# Patient Record
Sex: MALE | Race: BLACK OR AFRICAN AMERICAN | NOT HISPANIC OR LATINO | Employment: OTHER | URBAN - METROPOLITAN AREA
[De-identification: names, ages, dates, MRNs, and addresses within clinical notes are randomized per-mention and may not be internally consistent; named-entity substitution may affect disease eponyms.]

---

## 2024-01-02 ENCOUNTER — OFFICE VISIT (OUTPATIENT)
Dept: ORTHOPEDIC SURGERY | Facility: CLINIC | Age: 59
End: 2024-01-02
Payer: COMMERCIAL

## 2024-01-02 VITALS — WEIGHT: 220 LBS | BODY MASS INDEX: 30.8 KG/M2 | HEIGHT: 71 IN

## 2024-01-02 DIAGNOSIS — M65.341 TRIGGER FINGER, RIGHT RING FINGER: ICD-10-CM

## 2024-01-02 DIAGNOSIS — M65.342 TRIGGER FINGER, LEFT RING FINGER: ICD-10-CM

## 2024-01-02 DIAGNOSIS — G56.03 CARPAL TUNNEL SYNDROME, BILATERAL: Primary | ICD-10-CM

## 2024-01-02 DIAGNOSIS — M65.332 TRIGGER FINGER, LEFT MIDDLE FINGER: ICD-10-CM

## 2024-01-02 PROCEDURE — 1036F TOBACCO NON-USER: CPT | Performed by: ORTHOPAEDIC SURGERY

## 2024-01-02 PROCEDURE — 99214 OFFICE O/P EST MOD 30 MIN: CPT | Performed by: ORTHOPAEDIC SURGERY

## 2024-01-02 PROCEDURE — 2500000004 HC RX 250 GENERAL PHARMACY W/ HCPCS (ALT 636 FOR OP/ED): Performed by: ORTHOPAEDIC SURGERY

## 2024-01-02 PROCEDURE — 20550 NJX 1 TENDON SHEATH/LIGAMENT: CPT | Performed by: ORTHOPAEDIC SURGERY

## 2024-01-02 PROCEDURE — 2500000005 HC RX 250 GENERAL PHARMACY W/O HCPCS: Performed by: ORTHOPAEDIC SURGERY

## 2024-01-02 PROCEDURE — 20526 THER INJECTION CARP TUNNEL: CPT | Performed by: ORTHOPAEDIC SURGERY

## 2024-01-02 RX ORDER — LIDOCAINE HYDROCHLORIDE 10 MG/ML
0.5 INJECTION INFILTRATION; PERINEURAL
Status: COMPLETED | OUTPATIENT
Start: 2024-01-02 | End: 2024-01-02

## 2024-01-02 RX ORDER — TRIAMCINOLONE ACETONIDE 40 MG/ML
20 INJECTION, SUSPENSION INTRA-ARTICULAR; INTRAMUSCULAR
Status: COMPLETED | OUTPATIENT
Start: 2024-01-02 | End: 2024-01-02

## 2024-01-02 RX ADMIN — LIDOCAINE HYDROCHLORIDE 0.5 ML: 10 INJECTION, SOLUTION INFILTRATION; PERINEURAL at 12:44

## 2024-01-02 RX ADMIN — LIDOCAINE HYDROCHLORIDE 0.5 ML: 10 INJECTION, SOLUTION INFILTRATION; PERINEURAL at 12:45

## 2024-01-02 RX ADMIN — TRIAMCINOLONE ACETONIDE 20 MG: 40 INJECTION, SUSPENSION INTRA-ARTICULAR; INTRAMUSCULAR at 12:44

## 2024-01-02 RX ADMIN — TRIAMCINOLONE ACETONIDE 20 MG: 40 INJECTION, SUSPENSION INTRA-ARTICULAR; INTRAMUSCULAR at 12:45

## 2024-01-02 ASSESSMENT — PAIN - FUNCTIONAL ASSESSMENT: PAIN_FUNCTIONAL_ASSESSMENT: 0-10

## 2024-01-02 ASSESSMENT — PAIN SCALES - GENERAL: PAINLEVEL_OUTOF10: 5 - MODERATE PAIN

## 2024-01-02 ASSESSMENT — PAIN DESCRIPTION - DESCRIPTORS: DESCRIPTORS: ACHING

## 2024-01-02 NOTE — PROGRESS NOTES
Subjective    Patient ID: Ha Hdz is a 58 y.o. male right hand dominant . His last visit was on 02/16/2023 and he had bilateral tunnel injections at that time.    Chief Complaint: Pain and Injections of the Left Hand and Pain and Injections of the Right Hand     Last Surgery: No surgery found  Last Surgery Date: No surgery found    He presents today for a recurrence of his Carpal Tunnel syndrome and Trigger fingers. He had bilateral carpal tunnel injections in 02/2023. His trigger injections were performed prior to that in another institution. Left hand is more significantly bothersome than the  right. Numbness and tingling are worse at night time. Catching and triggering of multiple fingers are worse in the morning.         Assessment:  Bilateral carpal Tunnel Syndrome  Multiple Trigger syndrome      Plan:  He has requested injections to help  him get through the winter month but ultimately wants to schedule surgery for the let hand likely in 07/2024. He had  bilateral tunnel injections today, and trigger injections.Left middle and ring fingers and right middle  fingers. He would call to schedule his left carpal tunnel release under the local anesthesia in the summer of 2024.        Objective   Right Hand Exam     Comments:  O/E: No finger atrophy, flexion but triggers with terminal flexion of the middle and ring fingers and right ring fingers. Positive dark imaging compression test bilaterally with diminished sensation in the nerve distribution.            I    Assessment/Plan   Encounter Diagnoses:  Bilateral carpal Tunnel Syndrome  Multiple Trigger syndrome      Plan:  He has requested injections to help him get through the winter month but ultimately wants to schedule surgery for the let hand likely in 07/2024. He had  bilateral tunnel injections today, and trigger injections.Left middle and ring fingers and right middle  fingers. He would call to schedule his left carpal tunnel release under  the local anesthesia in the summer of 2024.      Hand / UE Inj/Asp: bilateral carpal tunnel for carpal tunnel syndrome on 1/2/2024 12:44 PM  Indications: pain and therapeutic  Details: 25 G needle, volar approach  Medications (Right): 20 mg triamcinolone acetonide 40 mg/mL; 0.5 mL lidocaine 10 mg/mL (1 %)  Medications (Left): 20 mg triamcinolone acetonide 40 mg/mL; 0.5 mL lidocaine 10 mg/mL (1 %)  Outcome: tolerated well, no immediate complications  Procedure, treatment alternatives, risks and benefits explained, specific risks discussed. Consent was given by the patient. Immediately prior to procedure a time out was called to verify the correct patient, procedure, equipment, support staff and site/side marked as required. Patient was prepped and draped in the usual sterile fashion.       Hand / UE Inj/Asp: bilateral ring A1 for trigger finger on 1/2/2024 12:44 PM  Indications: tendon swelling and pain  Details: 25 G needle, volar approach  Medications (Right): 20 mg triamcinolone acetonide 40 mg/mL; 0.5 mL lidocaine 10 mg/mL (1 %)  Medications (Left): 20 mg triamcinolone acetonide 40 mg/mL; 0.5 mL lidocaine 10 mg/mL (1 %)  Outcome: tolerated well, no immediate complications  Procedure, treatment alternatives, risks and benefits explained, specific risks discussed. Consent was given by the patient. Immediately prior to procedure a time out was called to verify the correct patient, procedure, equipment, support staff and site/side marked as required. Patient was prepped and draped in the usual sterile fashion.       Hand / UE Inj/Asp: L long A1 for trigger finger on 1/2/2024 12:45 PM  Indications: tendon swelling and pain  Details: 25 G needle, volar approach  Medications: 20 mg triamcinolone acetonide 40 mg/mL; 0.5 mL lidocaine 10 mg/mL (1 %)  Outcome: tolerated well, no immediate complications  Procedure, treatment alternatives, risks and benefits explained, specific risks discussed. Consent was given by the  patient. Immediately prior to procedure a time out was called to verify the correct patient, procedure, equipment, support staff and site/side marked as required. Patient was prepped and draped in the usual sterile fashion.           We discussed risks, benefits, alternatives and anticipated post op course including time away from work and activities following surgical treatment for the patient's condition. This is major surgery with identifiable risks. No guarantees for success have been provided. The patient has expressed understanding and has elected to pursue operative treatment.        For Surgical Planning:  Diagnosis: Left carpal tunnel syndrome, trigger finger left middle and ring finger  Procedure: Left carpal tunnel release, trigger finger release left middle and ring finger  CPT: 68663, 46145  Anesthesia: Local only  Duration: 35 minutes  Special Equipment Needed: None  Medical Notes / PM / DM / PAT needed?:  N/A  [facility]  Initial Post Operative Visit: 2 weeks    Brennan Min MD    [facility] [employer]  Department of Orthopaedic Surgery  Chief of Hand and Upper Extremity Surgery  [facility]    Dictation performed with the use of voice recognition software. Syntax and grammatical errors may exist.      Scribe Attestation  By signing my name below, I Cheryldelia Novak, Scribe   attest that this documentation has been prepared under the direction and in the presence of Brennan Min MD.

## 2024-01-22 ENCOUNTER — OFFICE VISIT (OUTPATIENT)
Dept: ORTHOPEDIC SURGERY | Facility: CLINIC | Age: 59
End: 2024-01-22
Payer: COMMERCIAL

## 2024-01-22 VITALS — WEIGHT: 220 LBS | BODY MASS INDEX: 30.8 KG/M2 | HEIGHT: 71 IN

## 2024-01-22 DIAGNOSIS — M25.512 ACUTE PAIN OF LEFT SHOULDER: ICD-10-CM

## 2024-01-22 DIAGNOSIS — M75.112 NONTRAUMATIC INCOMPLETE TEAR OF LEFT ROTATOR CUFF: Primary | ICD-10-CM

## 2024-01-22 PROCEDURE — 99214 OFFICE O/P EST MOD 30 MIN: CPT | Performed by: ORTHOPAEDIC SURGERY

## 2024-01-22 PROCEDURE — 1036F TOBACCO NON-USER: CPT | Performed by: ORTHOPAEDIC SURGERY

## 2024-01-22 PROCEDURE — 2500000004 HC RX 250 GENERAL PHARMACY W/ HCPCS (ALT 636 FOR OP/ED): Performed by: ORTHOPAEDIC SURGERY

## 2024-01-22 PROCEDURE — 2500000005 HC RX 250 GENERAL PHARMACY W/O HCPCS: Performed by: ORTHOPAEDIC SURGERY

## 2024-01-22 RX ORDER — METHYLPREDNISOLONE ACETATE 40 MG/ML
40 INJECTION, SUSPENSION INTRA-ARTICULAR; INTRALESIONAL; INTRAMUSCULAR; SOFT TISSUE
Status: COMPLETED | OUTPATIENT
Start: 2024-01-22 | End: 2024-01-22

## 2024-01-22 RX ORDER — LIDOCAINE HYDROCHLORIDE 20 MG/ML
2 INJECTION, SOLUTION INFILTRATION; PERINEURAL
Status: COMPLETED | OUTPATIENT
Start: 2024-01-22 | End: 2024-01-22

## 2024-01-22 RX ADMIN — METHYLPREDNISOLONE ACETATE 40 MG: 40 INJECTION, SUSPENSION INTRALESIONAL; INTRAMUSCULAR; INTRASYNOVIAL; SOFT TISSUE at 14:15

## 2024-01-22 RX ADMIN — LIDOCAINE HYDROCHLORIDE 2 ML: 20 INJECTION, SOLUTION INFILTRATION; PERINEURAL at 14:15

## 2024-01-22 ASSESSMENT — PAIN - FUNCTIONAL ASSESSMENT: PAIN_FUNCTIONAL_ASSESSMENT: 0-10

## 2024-01-22 ASSESSMENT — PAIN SCALES - GENERAL: PAINLEVEL_OUTOF10: 10 - WORST POSSIBLE PAIN

## 2024-01-22 NOTE — PROGRESS NOTES
Old patient of mine with known rotator cuff disease left shoulder has had surgeries on it in the past  It flared recently and this has been solved with an injection in the past  He works as a   He is right-hand dominant  Pain is moderately severe  Past medical,family and social histories have been reviewed and are up to date.  All other body systems have been reviewed and are negative for complaint.  Constitutional: Well-developed well-nourished   Eyes: Sclerae anicteric, pupils equal and round  HENT: Normocephalic atraumatic  Cardiovascular: Pulses full, regular rate and rhythm  Respiratory: Breathing not labored, no wheezing  Integumentary: Skin intact, no lesions or rashes  Neurological: Sensation intact, no gross strength deficits, reflexes equal  Psychiatric: Alert oriented and appropriate  Hematologic/lymphatic: No lymphadenopathy  Left shoulder: No mass swelling erythema, healed surgical scars mobility full but has pain in the impingement position  Satisfactory strength  Impression cuff syndrome flare  Injection today  L Inj/Asp on 1/22/2024 2:15 PM  Indications: pain  Details: 21 G needle, lateral approach  Medications: 40 mg methylPREDNISolone acetate 40 mg/mL; 2 mL lidocaine 20 mg/mL (2 %)

## 2024-04-24 ENCOUNTER — OFFICE VISIT (OUTPATIENT)
Dept: ORTHOPEDIC SURGERY | Facility: HOSPITAL | Age: 59
End: 2024-04-24
Payer: COMMERCIAL

## 2024-04-24 VITALS — HEIGHT: 70 IN | BODY MASS INDEX: 31.5 KG/M2 | WEIGHT: 220 LBS

## 2024-04-24 DIAGNOSIS — M75.100 TEAR OF ROTATOR CUFF, UNSPECIFIED LATERALITY, UNSPECIFIED TEAR EXTENT, UNSPECIFIED WHETHER TRAUMATIC: ICD-10-CM

## 2024-04-24 DIAGNOSIS — G56.03 CARPAL TUNNEL SYNDROME, BILATERAL: Primary | ICD-10-CM

## 2024-04-24 PROCEDURE — 99213 OFFICE O/P EST LOW 20 MIN: CPT | Performed by: PHYSICIAN ASSISTANT

## 2024-04-24 PROCEDURE — 20526 THER INJECTION CARP TUNNEL: CPT | Performed by: PHYSICIAN ASSISTANT

## 2024-04-24 PROCEDURE — 1036F TOBACCO NON-USER: CPT | Performed by: PHYSICIAN ASSISTANT

## 2024-04-24 PROCEDURE — 2500000004 HC RX 250 GENERAL PHARMACY W/ HCPCS (ALT 636 FOR OP/ED): Performed by: PHYSICIAN ASSISTANT

## 2024-04-24 PROCEDURE — 20610 DRAIN/INJ JOINT/BURSA W/O US: CPT | Performed by: PHYSICIAN ASSISTANT

## 2024-04-24 PROCEDURE — 2500000005 HC RX 250 GENERAL PHARMACY W/O HCPCS: Performed by: PHYSICIAN ASSISTANT

## 2024-04-24 RX ORDER — LIDOCAINE HYDROCHLORIDE 10 MG/ML
4 INJECTION INFILTRATION; PERINEURAL
Status: COMPLETED | OUTPATIENT
Start: 2024-04-24 | End: 2024-04-24

## 2024-04-24 RX ORDER — LIDOCAINE HYDROCHLORIDE 10 MG/ML
0.5 INJECTION INFILTRATION; PERINEURAL
Status: COMPLETED | OUTPATIENT
Start: 2024-04-24 | End: 2024-04-24

## 2024-04-24 RX ORDER — TRIAMCINOLONE ACETONIDE 40 MG/ML
40 INJECTION, SUSPENSION INTRA-ARTICULAR; INTRAMUSCULAR
Status: COMPLETED | OUTPATIENT
Start: 2024-04-24 | End: 2024-04-24

## 2024-04-24 RX ORDER — LISINOPRIL 10 MG/1
10 TABLET ORAL DAILY
COMMUNITY

## 2024-04-24 RX ORDER — TRIAMCINOLONE ACETONIDE 40 MG/ML
20 INJECTION, SUSPENSION INTRA-ARTICULAR; INTRAMUSCULAR
Status: COMPLETED | OUTPATIENT
Start: 2024-04-24 | End: 2024-04-24

## 2024-04-24 RX ADMIN — LIDOCAINE HYDROCHLORIDE 4 ML: 10 INJECTION, SOLUTION INFILTRATION; PERINEURAL at 11:54

## 2024-04-24 RX ADMIN — LIDOCAINE HYDROCHLORIDE 0.5 ML: 10 INJECTION, SOLUTION INFILTRATION; PERINEURAL at 11:54

## 2024-04-24 RX ADMIN — TRIAMCINOLONE ACETONIDE 40 MG: 400 INJECTION, SUSPENSION INTRA-ARTICULAR; INTRAMUSCULAR at 11:54

## 2024-04-24 RX ADMIN — TRIAMCINOLONE ACETONIDE 20 MG: 400 INJECTION, SUSPENSION INTRA-ARTICULAR; INTRAMUSCULAR at 11:54

## 2024-04-24 ASSESSMENT — PAIN SCALES - GENERAL: PAINLEVEL_OUTOF10: 5 - MODERATE PAIN

## 2024-04-24 ASSESSMENT — PAIN - FUNCTIONAL ASSESSMENT: PAIN_FUNCTIONAL_ASSESSMENT: 0-10

## 2024-04-24 NOTE — PROGRESS NOTES
Ha returns to clinic today for bilateral shoulder recurrence of pain.  He has been followed with Dr. Boland for this issue and has received intermittent injections.  Having recurrence of bilateral shoulder pain.  History of left shoulder rotator cuff repair and current rotator cuff disease surgery was over 25 years ago.  Was also told he has a torn rotator cuff on the right side however has never had surgery.  Is requesting steroid injection and is hesitant for any surgery.  Also complaining of bilateral hand burning and numbness, has been previously treated for bilateral carpal tunnel syndrome before and has done well with intermittent steroid injections.    Patient's self reported past medical history, medications, allergies, surgical history, family and social history as well as a 10 point review of systems has been documented in the new patient intake form and scanned into the patient's electronic medical record. Pertinent findings are documented in the HPI.    Physical Examination Findings:  Constitutional: Appears well-developed and well-nourished.  Head: Normocephalic and atraumatic.  Eyes: Pupils are equal and round.  Cardiovascular: Intact distal pulses.   Respiratory: Effort normal. No respiratory distress.  Neurologic: Alert and oriented to person, place, and time.  Skin: Skin is warm and dry.  Hematologic / Lymphatic: No lymphedema, lymphangitis.  Psychiatric: normal mood and affect. Behavior is normal.   Musculoskeletal: Bilateral shoulder with full range of motion in all directions.  Motor power is well-preserved in all directions in abduction 5/5 external rotation 5/5, internal rotation 5/5.  Pain with terminal end range of motion and resisted abduction.  Bilateral hands positive Munir median nerve compression test no thenar intrinsic muscle atrophy full digital finger range of motion without any triggering.    Impression: Bilateral rotator cuff pathology, bilateral carpal tunnel  syndrome    Plan: At this time patient is not interested in any further MRI imaging of the shoulder as he is hesitant for any surgical intervention repeat subacromial steroid injections were performed today and tolerated well.  He is also requesting repeat carpal tunnel steroid injection receivers were performed and tolerated well he will continue to monitor symptoms return to our office for recurrence or progression of symptoms.    Patient ID: Ha Hdz is a 59 y.o. male.    Hand / UE Inj/Asp: bilateral carpal tunnel for carpal tunnel syndrome on 4/24/2024 11:54 AM  Indications: therapeutic  Details: 25 G needle  Medications (Right): 0.5 mL lidocaine 10 mg/mL (1 %); 20 mg triamcinolone acetonide 40 mg/mL  Medications (Left): 20 mg triamcinolone acetonide 40 mg/mL; 0.5 mL lidocaine 10 mg/mL (1 %)  Procedure, treatment alternatives, risks and benefits explained, specific risks discussed. Immediately prior to procedure a time out was called to verify the correct patient, procedure, equipment, support staff and site/side marked as required.       L Inj/Asp: bilateral subacromial bursa on 4/24/2024 11:54 AM  Details: 22 G needle, posterior approach  Medications (Right): 40 mg triamcinolone acetonide 40 mg/mL; 4 mL lidocaine 10 mg/mL (1 %)  Medications (Left): 40 mg triamcinolone acetonide 40 mg/mL; 4 mL lidocaine 10 mg/mL (1 %)  Procedure, treatment alternatives, risks and benefits explained, specific risks discussed. Immediately prior to procedure a time out was called to verify the correct patient, procedure, equipment, support staff and site/side marked as required.           Patricia Amaro PA-C  Department of Orthopaedic Surgery  [facility]    Dictation performed with the use of voice recognition software. Syntax and grammatical errors may exist.

## 2024-04-30 ENCOUNTER — APPOINTMENT (OUTPATIENT)
Dept: ORTHOPEDIC SURGERY | Facility: CLINIC | Age: 59
End: 2024-04-30
Payer: COMMERCIAL

## 2024-05-07 ENCOUNTER — APPOINTMENT (OUTPATIENT)
Dept: ORTHOPEDIC SURGERY | Facility: CLINIC | Age: 59
End: 2024-05-07
Payer: COMMERCIAL

## 2024-08-12 ENCOUNTER — APPOINTMENT (OUTPATIENT)
Dept: ORTHOPEDIC SURGERY | Facility: CLINIC | Age: 59
End: 2024-08-12
Payer: COMMERCIAL

## 2024-08-13 ENCOUNTER — OFFICE VISIT (OUTPATIENT)
Dept: ORTHOPEDIC SURGERY | Facility: CLINIC | Age: 59
End: 2024-08-13
Payer: COMMERCIAL

## 2024-08-13 DIAGNOSIS — M65.339 TRIGGER MIDDLE FINGER, UNSPECIFIED LATERALITY: Primary | ICD-10-CM

## 2024-08-13 DIAGNOSIS — G56.03 CARPAL TUNNEL SYNDROME, BILATERAL: ICD-10-CM

## 2024-08-13 PROCEDURE — 99214 OFFICE O/P EST MOD 30 MIN: CPT | Performed by: ORTHOPAEDIC SURGERY

## 2024-08-13 PROCEDURE — 1036F TOBACCO NON-USER: CPT | Performed by: ORTHOPAEDIC SURGERY

## 2024-08-13 RX ORDER — TIZANIDINE 4 MG/1
4 TABLET ORAL EVERY 6 HOURS PRN
COMMUNITY
Start: 2024-04-01

## 2024-08-13 RX ORDER — CETIRIZINE HYDROCHLORIDE 10 MG/1
10 TABLET ORAL DAILY
COMMUNITY

## 2024-08-13 RX ORDER — IBUPROFEN 600 MG/1
TABLET ORAL
COMMUNITY

## 2024-08-13 RX ORDER — ALBUTEROL SULFATE 90 UG/1
2 INHALANT RESPIRATORY (INHALATION) EVERY 4 HOURS PRN
COMMUNITY
Start: 2016-05-27

## 2024-08-13 RX ORDER — EPINEPHRINE 0.3 MG/.3ML
0.3 INJECTION SUBCUTANEOUS
COMMUNITY
Start: 2024-06-28

## 2024-08-13 RX ORDER — ALBUTEROL SULFATE 90 UG/1
1-2 INHALANT RESPIRATORY (INHALATION) EVERY 6 HOURS PRN
COMMUNITY
Start: 2024-06-28

## 2024-08-13 RX ORDER — BUDESONIDE AND FORMOTEROL FUMARATE DIHYDRATE 160; 4.5 UG/1; UG/1
2 AEROSOL RESPIRATORY (INHALATION) 2 TIMES DAILY
COMMUNITY
Start: 2019-01-03

## 2024-08-13 NOTE — PROGRESS NOTES
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  All other body systems have been reviewed and are negative for complaint.  Constitutional: Well-developed well-nourished   Eyes: Sclerae anicteric, pupils equal and round  HENT: Normocephalic atraumatic  Cardiovascular: Pulses full, regular rate and rhythm  Respiratory: Breathing not labored, no wheezing  Integumentary: Skin intact, no lesions or rashes  Neurological:  no gross strength deficits, reflexes equal  Psychiatric: Alert oriented and appropriate  Hematologic/lymphatic: No lymphadenopathy  Both hands: No significant thenar atrophy diminished sensibility median nerve distribution positive Phalen's and Tinel's test satisfactory capillary refill triggering of both long and ring fingers.    Injected both carpal tunnels today plan for carpal tunnel release on the left with immediate release of left long anterior ring trigger fingers if EMG confirms diagnosis  Hand / UE Inj/Asp: bilateral carpal tunnel for carpal tunnel syndrome on 8/14/2024 12:37 PM  Indications: pain  Details: 25 G needle, volar approach  Medications (Right): 20 mg methylPREDNISolone acetate 40 mg/mL; 2 mL lidocaine 20 mg/mL (2 %)  Medications (Left): 20 mg methylPREDNISolone acetate 40 mg/mL; 2 mL lidocaine 20 mg/mL (2 %)

## 2024-08-14 PROCEDURE — 20526 THER INJECTION CARP TUNNEL: CPT | Performed by: ORTHOPAEDIC SURGERY

## 2024-08-14 RX ORDER — METHYLPREDNISOLONE ACETATE 40 MG/ML
20 INJECTION, SUSPENSION INTRA-ARTICULAR; INTRALESIONAL; INTRAMUSCULAR; SOFT TISSUE
Status: COMPLETED | OUTPATIENT
Start: 2024-08-14 | End: 2024-08-14

## 2024-08-14 RX ORDER — LIDOCAINE HYDROCHLORIDE 20 MG/ML
2 INJECTION, SOLUTION INFILTRATION; PERINEURAL
Status: COMPLETED | OUTPATIENT
Start: 2024-08-14 | End: 2024-08-14

## 2024-09-25 ENCOUNTER — OFFICE VISIT (OUTPATIENT)
Dept: ORTHOPEDIC SURGERY | Facility: CLINIC | Age: 59
End: 2024-09-25
Payer: COMMERCIAL

## 2024-09-25 DIAGNOSIS — M65.342 TRIGGER RING FINGER OF LEFT HAND: ICD-10-CM

## 2024-09-25 DIAGNOSIS — M65.331 TRIGGER MIDDLE FINGER OF RIGHT HAND: ICD-10-CM

## 2024-09-25 DIAGNOSIS — G56.02 CARPAL TUNNEL SYNDROME ON LEFT: ICD-10-CM

## 2024-09-25 DIAGNOSIS — M65.341 TRIGGER RING FINGER OF RIGHT HAND: ICD-10-CM

## 2024-09-25 DIAGNOSIS — G56.01 CARPAL TUNNEL SYNDROME ON RIGHT: Primary | ICD-10-CM

## 2024-09-25 DIAGNOSIS — M65.332 TRIGGER MIDDLE FINGER OF LEFT HAND: ICD-10-CM

## 2024-09-25 PROCEDURE — 99213 OFFICE O/P EST LOW 20 MIN: CPT | Performed by: ORTHOPAEDIC SURGERY

## 2024-09-25 PROCEDURE — 1036F TOBACCO NON-USER: CPT | Performed by: ORTHOPAEDIC SURGERY

## 2024-09-25 RX ORDER — BUPIVACAINE HYDROCHLORIDE 5 MG/ML
10 INJECTION, SOLUTION EPIDURAL; INTRACAUDAL ONCE
OUTPATIENT
Start: 2024-09-25 | End: 2024-09-25

## 2024-09-25 RX ORDER — SODIUM CHLORIDE, SODIUM LACTATE, POTASSIUM CHLORIDE, CALCIUM CHLORIDE 600; 310; 30; 20 MG/100ML; MG/100ML; MG/100ML; MG/100ML
20 INJECTION, SOLUTION INTRAVENOUS CONTINUOUS
OUTPATIENT
Start: 2024-09-25

## 2024-09-25 NOTE — PROGRESS NOTES
Subjective    Patient ID: Ha Hdz is a 59 y.o. male.    Chief Complaint: OTHER (F/U BILATERAL HANDS)     Last Surgery: No surgery found  Last Surgery Date: No surgery found    HPI  The patient is a 59-year-old right-hand-dominant gentleman who comes in with a complaint of bilateral hand numbness as well as pain and locking in his bilateral middle and ring fingers.  He has undergone previous Kenalog injections by different surgeon for the carpal tunnel syndrome.  However he has noticed the symptoms are beginning to recur.  He has not had any previous treatment for the trigger digits.  His right hand overall is more symptomatic than the left.    Objective   Ortho Exam  Patient is in no acute distress.  Exam of his right hand and wrist reveals there is no thenar or intrinsic atrophy.  He has a positive Tinel's test, a positive carpal tunnel compression test and a positive Phalen test.  He is exquisitely tender to palpation over the A1 pulleys to the middle and ring fingers.  Both of these fingers were locking with flexion at the PIP joint.    Exam of his left hand and wrist reveals his skin envelope is intact.  There is no thenar or intrinsic atrophy.  He has a positive Tinel's test and a positive Phalen test.  He is tender palpation over the A1 pulley to the middle and ring fingers.  Both of these fingers again were locking with flexion at the PIP joints.    Assessment/Plan   Encounter Diagnoses:  Carpal tunnel syndrome on right    Trigger middle finger of right hand    Trigger ring finger of right hand    Carpal tunnel syndrome on left    Trigger middle finger of left hand    Trigger ring finger of left hand    Orders Placed This Encounter    Request for Pre-Admission Testing Visit    Request for Pre-Admission Testing Visit    Case Request Operating Room: Decompression Median Nerve with Carpal Tunnel Release, RELEASE,SOFT TISSUE,UPPER EXTREMITY    Case Request Operating Room: Decompression Median Nerve with  Carpal Tunnel Release, RELEASE,SOFT TISSUE,UPPER EXTREMITY     Patient has bilateral carpal tunnel syndrome as well as bilateral middle and ring finger trigger digits.  We discussed further conservative treatment versus surgical intervention.  He would prefer to undergo surgery in both hands.  I discussed with him in detail the risk, benefits alternatives of staged, bilateral carpal tunnel releases as well as middle and trigger finger releases.  The patient voiced understanding and informed consent was obtained.  The patient will call to schedule surgery.

## 2024-10-08 PROBLEM — M65.331 TRIGGER FINGER, RIGHT MIDDLE FINGER: Status: ACTIVE | Noted: 2024-09-25

## 2024-10-08 PROBLEM — M65.341 TRIGGER RING FINGER OF RIGHT HAND: Status: ACTIVE | Noted: 2024-09-25

## 2024-10-08 PROBLEM — G56.01 CARPAL TUNNEL SYNDROME ON RIGHT: Status: ACTIVE | Noted: 2024-09-25

## 2024-10-25 ENCOUNTER — PRE-ADMISSION TESTING (OUTPATIENT)
Dept: PREADMISSION TESTING | Facility: HOSPITAL | Age: 59
End: 2024-10-25
Payer: COMMERCIAL

## 2024-10-25 VITALS
OXYGEN SATURATION: 99 % | BODY MASS INDEX: 31.81 KG/M2 | TEMPERATURE: 96.4 F | WEIGHT: 222.22 LBS | RESPIRATION RATE: 20 BRPM | HEIGHT: 70 IN | HEART RATE: 54 BPM

## 2024-10-25 DIAGNOSIS — Z01.818 PREOP TESTING: Primary | ICD-10-CM

## 2024-10-25 DIAGNOSIS — I10 HYPERTENSION, UNSPECIFIED TYPE: ICD-10-CM

## 2024-10-25 DIAGNOSIS — G56.00 CARPAL TUNNEL SYNDROME, UNSPECIFIED LATERALITY: ICD-10-CM

## 2024-10-25 LAB
ANION GAP SERPL CALC-SCNC: 10 MMOL/L (ref 10–20)
BUN SERPL-MCNC: 16 MG/DL (ref 6–23)
CALCIUM SERPL-MCNC: 9.4 MG/DL (ref 8.6–10.3)
CHLORIDE SERPL-SCNC: 105 MMOL/L (ref 98–107)
CO2 SERPL-SCNC: 26 MMOL/L (ref 21–32)
CREAT SERPL-MCNC: 0.87 MG/DL (ref 0.5–1.3)
EGFRCR SERPLBLD CKD-EPI 2021: >90 ML/MIN/1.73M*2
ERYTHROCYTE [DISTWIDTH] IN BLOOD BY AUTOMATED COUNT: 12.2 % (ref 11.5–14.5)
GLUCOSE SERPL-MCNC: 102 MG/DL (ref 74–99)
HCT VFR BLD AUTO: 41.8 % (ref 41–52)
HGB BLD-MCNC: 13.7 G/DL (ref 13.5–17.5)
MCH RBC QN AUTO: 30.4 PG (ref 26–34)
MCHC RBC AUTO-ENTMCNC: 32.8 G/DL (ref 32–36)
MCV RBC AUTO: 93 FL (ref 80–100)
NRBC BLD-RTO: 0 /100 WBCS (ref 0–0)
PLATELET # BLD AUTO: 245 X10*3/UL (ref 150–450)
POTASSIUM SERPL-SCNC: 4.1 MMOL/L (ref 3.5–5.3)
RBC # BLD AUTO: 4.51 X10*6/UL (ref 4.5–5.9)
SODIUM SERPL-SCNC: 137 MMOL/L (ref 136–145)
WBC # BLD AUTO: 3.4 X10*3/UL (ref 4.4–11.3)

## 2024-10-25 PROCEDURE — 80048 BASIC METABOLIC PNL TOTAL CA: CPT

## 2024-10-25 PROCEDURE — 36415 COLL VENOUS BLD VENIPUNCTURE: CPT

## 2024-10-25 PROCEDURE — 99244 OFF/OP CNSLTJ NEW/EST MOD 40: CPT | Performed by: NURSE PRACTITIONER

## 2024-10-25 PROCEDURE — 85027 COMPLETE CBC AUTOMATED: CPT

## 2024-10-25 PROCEDURE — 93005 ELECTROCARDIOGRAM TRACING: CPT

## 2024-10-25 ASSESSMENT — DUKE ACTIVITY SCORE INDEX (DASI)
CAN YOU PARTICIPATE IN MODERATE RECREATIONAL ACTIVITIES LIKE GOLF, BOWLING, DANCING, DOUBLES TENNIS OR THROWING A BASEBALL OR FOOTBALL: NO
CAN YOU WALK INDOORS, SUCH AS AROUND YOUR HOUSE: YES
CAN YOU CLIMB A FLIGHT OF STAIRS OR WALK UP A HILL: YES
TOTAL_SCORE: 32.2
CAN YOU RUN A SHORT DISTANCE: YES
CAN YOU DO MODERATE WORK AROUND THE HOUSE LIKE VACUUMING, SWEEPING FLOORS OR CARRYING GROCERIES: YES
CAN YOU PARTICIPATE IN STRENOUS SPORTS LIKE SWIMMING, SINGLES TENNIS, FOOTBALL, BASKETBALL, OR SKIING: NO
CAN YOU DO HEAVY WORK AROUND THE HOUSE LIKE SCRUBBING FLOORS OR LIFTING AND MOVING HEAVY FURNITURE: NO
CAN YOU DO YARD WORK LIKE RAKING LEAVES, WEEDING OR PUSHING A MOWER: NO
CAN YOU WALK A BLOCK OR TWO ON LEVEL GROUND: YES
CAN YOU TAKE CARE OF YOURSELF (EAT, DRESS, BATHE, OR USE TOILET): YES
CAN YOU HAVE SEXUAL RELATIONS: YES
CAN YOU DO LIGHT WORK AROUND THE HOUSE LIKE DUSTING OR WASHING DISHES: YES
DASI METS SCORE: 6.7

## 2024-10-25 ASSESSMENT — PAIN DESCRIPTION - DESCRIPTORS: DESCRIPTORS: ACHING

## 2024-10-25 ASSESSMENT — PAIN - FUNCTIONAL ASSESSMENT: PAIN_FUNCTIONAL_ASSESSMENT: 0-10

## 2024-10-25 ASSESSMENT — PAIN SCALES - GENERAL: PAINLEVEL_OUTOF10: 10 - WORST POSSIBLE PAIN

## 2024-10-25 NOTE — PREPROCEDURE INSTRUCTIONS
Medication List            Accurate as of October 25, 2024 10:01 AM. Always use your most recent med list.                * ProAir HFA 90 mcg/actuation inhaler  Generic drug: albuterol  Medication Adjustments for Surgery: Take/Use as prescribed     * albuterol 90 mcg/actuation inhaler  Medication Adjustments for Surgery: Take/Use as prescribed     cetirizine 10 mg tablet  Commonly known as: ZyrTEC  Medication Adjustments for Surgery: Do Not take on the morning of surgery     EPINEPHrine 0.3 mg/0.3 mL injection syringe  Commonly known as: Epipen  Medication Adjustments for Surgery: Take/Use as prescribed      mg tablet  Generic drug: ibuprofen  Additional Medication Adjustments for Surgery: Take last dose 7 days before surgery     lisinopril 10 mg tablet  Medication Adjustments for Surgery: Take last dose 1 day (24 hours) before surgery  Additional Medication Adjustments for Surgery: Other (Comment)  Notes to patient: Do not take night before or day of surgery - (Do not take 24 hours before surgery)     Symbicort 160-4.5 mcg/actuation inhaler  Generic drug: budesonide-formoteroL  Medication Adjustments for Surgery: Take/Use as prescribed     tiZANidine 4 mg tablet  Commonly known as: Zanaflex  Medication Adjustments for Surgery: Do Not take on the morning of surgery           * This list has 2 medication(s) that are the same as other medications prescribed for you. Read the directions carefully, and ask your doctor or other care provider to review them with you.                                  NPO Instructions:    Do not eat any food after midnight the night before your surgery/procedure.    Additional Instructions:     Day of Surgery:  You may chew gum up to TWO hours before your surgery/procedure  Wear  comfortable loose fitting clothing  Do not use moisturizers, creams, lotions or perfume      PRE-OPERATIVE INSTRUCTIONS FOR SURGERY    *Do not eat anything after midnight the night of surgery.  This  includes food of any kind (including hard candy, cough drops, mints).   You may have up to 13 ounces of clear liquid  until TWO hours prior to your scheduled surgery time, unless ERAS* protocol is ordered for you.  Clear liquids include water, black tea/coffee, (no milk or cream) apple juice and electrolyte drinks (GATORADE).  You may chew gum until TWO hours prior you your surgery/procedure.     *ERAS protocol: follow as instructed.  DO not drink an additional 13 ounces as noted above.        *One of our staff members will call you ONE business day before your surgery, between 11 am-2 pm to let you know the time to arrive.  If you have not received a call by 2 pm, call [phone]  *When you arrive at the hospital-->GO TO Registration on the ground floor  *Stop smoking 24 hours prior to surgery.  No Marijuana, CBD Oil or Vaping for 48 hours  *No alcohol 24 hours prior to surgery  *You will need a responsible adult to drive you home  -No acrylic nails or nail polish on at least one fingernail, NO polish on toes for foot surgery  -You may be asked to remove your dentures, partial plate, eyeglasses or contact lenses before going to surgery.  Please bring a case for these items.  -Body piercings need to be removed.  Jewelry and valuables should be left at home.  -Put on loose,  comfortable, clean clothing, that will accommodate bandages        What is a home antibacterial shower?  This shower is a way of cleaning the skin with a germ killing solution before surgery.  The solution contains chlorhexidine, commonly known as CHG.  CHG is a skin cleanser with germ killing ability.  Let your doctor know if you are allergic to chlorhexidine.    Why do I need to take a preoperative antibacterial shower?  Skin is not sterile.  It is best to try to make your skin as free of germs as possible before surgery.  Proper cleansing with a germ killing soap before surgery can lower the number of germs on your skin.  This helps to  reduce the risk of infection at the surgical site.  Following the instructions listed below will help you prepare your skin for surgery.      How do I use the solution?    Steps: Begin using your CHG soap 5 days before your surgery on __________________.    *First, wash and rinse your hair using the CHG soap.  Keep CHG soap away from ear canals and eyes.   Rinse completely, do not condition.  Hair extensions should be removed.    *Wash your face with your normal soap and rinse.   *Apply the CHG solution to a clean wet washcloth.  Turn the water off or move away from the water spray to avoid premature rinsing of the CHG soap as you are applying.  Firmly lather your entire body from the neck down.  Do not use on your face.    *Pay special attention to the area(s) where your incision(s) will be located unless they are on your face.  Avoid scrubbing your skin too hard.  The important part is to have the CHG soap sit on your skin for 3 minutes.   *When the 3 minutes are up, turn on the water and rinse the CHG solution off your body completely.  *Do not wash with regular soap after you  have  used the CHG soap solution.  *Pat  yourself dry with a clean, freshly laundered towel.  *Do not apply powders, deodorants or lotions.  *Dress in clean freshly laundered night clothes.    *Be sure to sleep with clean freshly laundered sheets.    *Be aware the CHG will cause stains on fabrics; if you wash them with bleach after use.  Rinse your washcloth and other linens that have contact with CHG completely.  Use only non-chlorine detergents to launder the items  used.  *The morning of surgery is the fifth day.  Repeat the above steps and dress in clean comfortable clothing.     What is oral/dental rinse?  It is mouthwash.  It is a way of cleaning the he mouth with a germ-killing solution before your surgery.  The solution contains chlorhexidine, commonly known as CHG.  It is used inside the mouth to kill a bacteria known as  Staphylococcus aureus.  Let your doctor know if you are allergic to Chlorhexidine.    Why do I need to use CHG oral/ dental rinse?  The CHG oral/dental rinse helps to kill bacteria in your mouth know as Staphylococcus aureus.  This reduces the risk of infection at the surgical site.    Using your CHG oral/dental rinse    STEPS:    Use your CHG oral/dental rinse after you brush your teeth the night before (at bedtime) and the morning of your surgery.  Follow all the directions on your prescription label.  *Use the cap on the container to measure 15 ml  *Swish (gargle if you can) the mouthwash in your mouth for at least 30 seconds, (do not swallow) and spit out  *After you use your CHG rinse, do not rinse your mouth with water, drink or eat.  Please refer to the prescription label for the appropriate time to resume oral intake.    What side effects might I have using the CHG oral/dental rinse?  CHG rinse will stick you plaque on the teeth.  Brush and floss just before use.   Teeth brushing will help avoid staining of the plaque during  use.  Teeth brushing will help avoid staining of plaque during  use.    Who should I contact if I have questions about the CHG oral/dental rinse and or CHG soap?  Please call [facility], Pre-Admission testing at [phone] if you have any questions.    What you may be asked to bring to surgery:  ___Crutches, walker  ___CPAP machine  ___Urine specimen

## 2024-10-25 NOTE — H&P (VIEW-ONLY)
CPM/PAT Evaluation       Name: Ha Hdz (Ha Hdz)  /Age: 1965/59 y.o.     In-Person       Chief Complaint: PAT for planned carpal tunnel surgery    59 yr old male w/PHx of asthma, HTN, chronic back pain and carpal tunnel syndrome referred to PAT for planned Right carpal tunnel release w/Dr Martinez on 2024     Patient reports feeling overall well, denies fever, cough or recent infection. 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 Reports intermittent use of rescue inhaler, last time was one month ago. Past surgical hx includes shoulder surgery x2 and most recently spine surgery (3/2024); denies past issues with anesthesia.      Followed by PCP (YAMIL Cisse) - last visit couple months ago per pt   Followed by neurosurgery at [facility] for chronic back pain        Past Medical History:   Diagnosis Date    Anesthesia of skin     Numbness    Other conditions influencing health status 2017    History of cough    Other specified disorders of nose and nasal sinuses 2017    Sinus pressure    Other specified disorders of nose and nasal sinuses 2019    Sinus pressure    Other specified health status     Known health problems: none    Personal history of other diseases of the musculoskeletal system and connective tissue     History of low back pain       Past Surgical History:   Procedure Laterality Date    OTHER SURGICAL HISTORY  2019    Rotator cuff repair   Right L3-L4 hemilaminotomy/microdiscectomy 3/18/2024    Patient  has no history on file for sexual activity.    No family history on file.    Allergies   Allergen Reactions    Adhesive Other     bandaids burn skin    Penicillins Hives    Latex, Natural Rubber Rash       Prior to Admission medications    Medication Sig Start Date End Date Taking? Authorizing Provider   albuterol (ProAir HFA) 90 mcg/actuation inhaler Inhale 2 puffs every 4 hours if needed.  5/27/16   Historical Provider, MD   albuterol 90 mcg/actuation inhaler Inhale 1-2 puffs every 6 hours if needed. 6/28/24   Historical Provider, MD   budesonide-formoteroL (Symbicort) 160-4.5 mcg/actuation inhaler Inhale 2 puffs 2 times a day. 1/3/19   Historical Provider, MD   cetirizine (ZyrTEC) 10 mg tablet Take 1 tablet (10 mg) by mouth once daily.    Historical Provider, MD   EPINEPHrine 0.3 mg/0.3 mL injection syringe Inject 0.3 mL (0.3 mg) into the muscle. 6/28/24   Historical Provider, MD    mg tablet TAKE ONE TABLET BY MOUTH EVERY 6 HOURS AS NEEDED FOR PAIN FOR UP TO 10 DAYS    Historical Provider, MD   lisinopril 10 mg tablet Take 1 tablet (10 mg) by mouth once daily.    Historical Provider, MD   tiZANidine (Zanaflex) 4 mg tablet Take 1 tablet (4 mg) by mouth every 6 hours if needed. 4/1/24   Historical Provider, MD        Review of Systems    Constitutional: no fever, no chills and not feeling poorly.   Eyes: no eyesight problems.   ENT: no hearing loss, no nosebleeds and no sore throat.   Cardiovascular: no chest pain, no palpitations and no extremity edema.   Respiratory: no sob, no wheezing, no cough and no sob w/exertion.   Gastrointestinal: negative for abdominal pain, blood in stools or changes in bowel habits   Genitourinary: negative for dysuria, incontinence or changes in urinary habits   Musculoskeletal: +arthralgias, ambulates independently.   Integumentary: negative for lesions, rash or itching.   Neurological: negative for confusion, dizziness or fainting.   Psychiatric: not suicidal, no anxiety and no depression.   All other systems have been reviewed and are negative for complaint.     Physical Exam  Vitals reviewed.   HENT:      Head: Normocephalic.   Eyes:      Pupils: Pupils are equal, round, and reactive to light.   Cardiovascular:      Rate and Rhythm: Normal rate and regular rhythm.      Pulses: Normal pulses.   Pulmonary:      Effort: Pulmonary effort is normal.      Breath  sounds: Normal breath sounds.   Abdominal:      General: Bowel sounds are normal.   Musculoskeletal:         General: Normal range of motion.      Cervical back: Normal range of motion.   Skin:     General: Skin is warm and dry.   Neurological:      Mental Status: He is alert and oriented to person, place, and time.   Psychiatric:         Mood and Affect: Mood normal.          PAT AIRWAY:   Airway:     Mallampati::  I    TM distance::  >3 FB    Neck ROM::  Full  normal        Visit Vitals  Pulse 54   Temp 35.8 °C (96.4 °F) (Temporal)   Resp 20       DASI Risk Score      Flowsheet Row Pre-Admission Testing from 10/25/2024 in [facility]   Can you take care of yourself (eat, dress, bathe, or use toilet)?  2.75 filed at 10/25/2024 0928   Can you walk indoors, such as around your house? 1.75 filed at 10/25/2024 0928   Can you walk a block or two on level ground?  2.75 filed at 10/25/2024 0928   Can you climb a flight of stairs or walk up a hill? 5.5 filed at 10/25/2024 0928   Can you run a short distance? 8 filed at 10/25/2024 0928   Can you do light work around the house like dusting or washing dishes? 2.7 filed at 10/25/2024 0928   Can you do moderate work around the house like vacuuming, sweeping floors or carrying groceries? 3.5 filed at 10/25/2024 0928   Can you do heavy work around the house like scrubbing floors or lifting and moving heavy furniture?  0 filed at 10/25/2024 0928   Can you do yard work like raking leaves, weeding or pushing a mower? 0 filed at 10/25/2024 0928   Can you have sexual relations? 5.25 filed at 10/25/2024 0928   Can you participate in moderate recreational activities like golf, bowling, dancing, doubles tennis or throwing a baseball or football? 0 filed at 10/25/2024 0928   Can you participate in strenous sports like swimming, singles tennis, football, basketball, or skiing? 0 filed at 10/25/2024 0928   DASI SCORE 32.2 filed at 10/25/2024 0928   METS Score (Will be calculated  only when all the questions are answered) 6.7 filed at 10/25/2024 0928          Caprini DVT Assessment    No data to display       Modified Frailty Index    No data to display       CHADS2 Stroke Risk  Current as of about an hour ago        N/A 3 to 100%: High Risk   2 to < 3%: Medium Risk   0 to < 2%: Low Risk     Last Change: N/A          This score determines the patient's risk of having a stroke if the patient has atrial fibrillation.        This score is not applicable to this patient. Components are not calculated.          Revised Cardiac Risk Index    No data to display       Apfel Simplified Score    No data to display       Risk Analysis Index Results This Encounter    No data found in the last 10 encounters.       Stop Bang Score      Flowsheet Row Pre-Admission Testing from 10/25/2024 in [facility]   Do you snore loudly? 1 filed at 10/25/2024 0928   Do you often feel tired or fatigued after your sleep? 0 filed at 10/25/2024 0928   Has anyone ever observed you stop breathing in your sleep? 0 filed at 10/25/2024 0928   Do you have or are you being treated for high blood pressure? 1 filed at 10/25/2024 0928   Recent BMI (Calculated) 31.9 filed at 10/25/2024 0928   Is BMI greater than 35 kg/m2? 0=No filed at 10/25/2024 0928   Age older than 50 years old? 1=Yes filed at 10/25/2024 0928   Is your neck circumference greater than 17 inches (Male) or 16 inches (Female)? 1 filed at 10/25/2024 0928   Gender - Male 1=Yes filed at 10/25/2024 0928   STOP-BANG Total Score 5 filed at 10/25/2024 0928          Prodigy: High Risk  Total Score: 8              Prodigy Gender Score          ARISCAT Score for Postoperative Pulmonary Complications    No data to display       Bui Perioperative Risk for Myocardial Infarction or Cardiac Arrest (BRI)    No data to display         Assessment and Plan:   # Asthma - continue inhalers  # HTN - hold lisinopril evening before and day of surgery  # Carpal tunnel syndrome -  Right carpal tunnel release w/Dr Martinez on 11/5/2024    Ecg performed today - Sinus bradycardia w/sinus arrhythmia, Voltage criteria for Left ventricular hypertrophy, Nonspecific T wave abnormality (54 bpm)  Medical hx, Allergies, VS and Labs reviewed  Medications addressed w/pre-op instructions provided

## 2024-10-25 NOTE — CPM/PAT H&P
CPM/PAT Evaluation       Name: Ha Hdz (Ha Hdz)  /Age: 1965/59 y.o.     In-Person       Chief Complaint: PAT for planned carpal tunnel surgery    59 yr old male w/PHx of asthma, HTN, chronic back pain and carpal tunnel syndrome referred to PAT for planned Right carpal tunnel release w/Dr Martinez on 2024     Patient reports feeling overall well, denies fever, cough or recent infection. 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 Reports intermittent use of rescue inhaler, last time was one month ago. Past surgical hx includes shoulder surgery x2 and most recently spine surgery (3/2024); denies past issues with anesthesia.      Followed by PCP (YAMIL Cisse) - last visit couple months ago per pt   Followed by neurosurgery at [facility] for chronic back pain        Past Medical History:   Diagnosis Date    Anesthesia of skin     Numbness    Other conditions influencing health status 2017    History of cough    Other specified disorders of nose and nasal sinuses 2017    Sinus pressure    Other specified disorders of nose and nasal sinuses 2019    Sinus pressure    Other specified health status     Known health problems: none    Personal history of other diseases of the musculoskeletal system and connective tissue     History of low back pain       Past Surgical History:   Procedure Laterality Date    OTHER SURGICAL HISTORY  2019    Rotator cuff repair   Right L3-L4 hemilaminotomy/microdiscectomy 3/18/2024    Patient  has no history on file for sexual activity.    No family history on file.    Allergies   Allergen Reactions    Adhesive Other     bandaids burn skin    Penicillins Hives    Latex, Natural Rubber Rash       Prior to Admission medications    Medication Sig Start Date End Date Taking? Authorizing Provider   albuterol (ProAir HFA) 90 mcg/actuation inhaler Inhale 2 puffs every 4 hours if needed.  5/27/16   Historical Provider, MD   albuterol 90 mcg/actuation inhaler Inhale 1-2 puffs every 6 hours if needed. 6/28/24   Historical Provider, MD   budesonide-formoteroL (Symbicort) 160-4.5 mcg/actuation inhaler Inhale 2 puffs 2 times a day. 1/3/19   Historical Provider, MD   cetirizine (ZyrTEC) 10 mg tablet Take 1 tablet (10 mg) by mouth once daily.    Historical Provider, MD   EPINEPHrine 0.3 mg/0.3 mL injection syringe Inject 0.3 mL (0.3 mg) into the muscle. 6/28/24   Historical Provider, MD    mg tablet TAKE ONE TABLET BY MOUTH EVERY 6 HOURS AS NEEDED FOR PAIN FOR UP TO 10 DAYS    Historical Provider, MD   lisinopril 10 mg tablet Take 1 tablet (10 mg) by mouth once daily.    Historical Provider, MD   tiZANidine (Zanaflex) 4 mg tablet Take 1 tablet (4 mg) by mouth every 6 hours if needed. 4/1/24   Historical Provider, MD        Review of Systems    Constitutional: no fever, no chills and not feeling poorly.   Eyes: no eyesight problems.   ENT: no hearing loss, no nosebleeds and no sore throat.   Cardiovascular: no chest pain, no palpitations and no extremity edema.   Respiratory: no sob, no wheezing, no cough and no sob w/exertion.   Gastrointestinal: negative for abdominal pain, blood in stools or changes in bowel habits   Genitourinary: negative for dysuria, incontinence or changes in urinary habits   Musculoskeletal: +arthralgias, ambulates independently.   Integumentary: negative for lesions, rash or itching.   Neurological: negative for confusion, dizziness or fainting.   Psychiatric: not suicidal, no anxiety and no depression.   All other systems have been reviewed and are negative for complaint.     Physical Exam  Vitals reviewed.   HENT:      Head: Normocephalic.   Eyes:      Pupils: Pupils are equal, round, and reactive to light.   Cardiovascular:      Rate and Rhythm: Normal rate and regular rhythm.      Pulses: Normal pulses.   Pulmonary:      Effort: Pulmonary effort is normal.      Breath  sounds: Normal breath sounds.   Abdominal:      General: Bowel sounds are normal.   Musculoskeletal:         General: Normal range of motion.      Cervical back: Normal range of motion.   Skin:     General: Skin is warm and dry.   Neurological:      Mental Status: He is alert and oriented to person, place, and time.   Psychiatric:         Mood and Affect: Mood normal.          PAT AIRWAY:   Airway:     Mallampati::  I    TM distance::  >3 FB    Neck ROM::  Full  normal        Visit Vitals  Pulse 54   Temp 35.8 °C (96.4 °F) (Temporal)   Resp 20       DASI Risk Score      Flowsheet Row Pre-Admission Testing from 10/25/2024 in [facility]   Can you take care of yourself (eat, dress, bathe, or use toilet)?  2.75 filed at 10/25/2024 0928   Can you walk indoors, such as around your house? 1.75 filed at 10/25/2024 0928   Can you walk a block or two on level ground?  2.75 filed at 10/25/2024 0928   Can you climb a flight of stairs or walk up a hill? 5.5 filed at 10/25/2024 0928   Can you run a short distance? 8 filed at 10/25/2024 0928   Can you do light work around the house like dusting or washing dishes? 2.7 filed at 10/25/2024 0928   Can you do moderate work around the house like vacuuming, sweeping floors or carrying groceries? 3.5 filed at 10/25/2024 0928   Can you do heavy work around the house like scrubbing floors or lifting and moving heavy furniture?  0 filed at 10/25/2024 0928   Can you do yard work like raking leaves, weeding or pushing a mower? 0 filed at 10/25/2024 0928   Can you have sexual relations? 5.25 filed at 10/25/2024 0928   Can you participate in moderate recreational activities like golf, bowling, dancing, doubles tennis or throwing a baseball or football? 0 filed at 10/25/2024 0928   Can you participate in strenous sports like swimming, singles tennis, football, basketball, or skiing? 0 filed at 10/25/2024 0928   DASI SCORE 32.2 filed at 10/25/2024 0928   METS Score (Will be calculated  only when all the questions are answered) 6.7 filed at 10/25/2024 0928          Caprini DVT Assessment    No data to display       Modified Frailty Index    No data to display       CHADS2 Stroke Risk  Current as of about an hour ago        N/A 3 to 100%: High Risk   2 to < 3%: Medium Risk   0 to < 2%: Low Risk     Last Change: N/A          This score determines the patient's risk of having a stroke if the patient has atrial fibrillation.        This score is not applicable to this patient. Components are not calculated.          Revised Cardiac Risk Index    No data to display       Apfel Simplified Score    No data to display       Risk Analysis Index Results This Encounter    No data found in the last 10 encounters.       Stop Bang Score      Flowsheet Row Pre-Admission Testing from 10/25/2024 in [facility]   Do you snore loudly? 1 filed at 10/25/2024 0928   Do you often feel tired or fatigued after your sleep? 0 filed at 10/25/2024 0928   Has anyone ever observed you stop breathing in your sleep? 0 filed at 10/25/2024 0928   Do you have or are you being treated for high blood pressure? 1 filed at 10/25/2024 0928   Recent BMI (Calculated) 31.9 filed at 10/25/2024 0928   Is BMI greater than 35 kg/m2? 0=No filed at 10/25/2024 0928   Age older than 50 years old? 1=Yes filed at 10/25/2024 0928   Is your neck circumference greater than 17 inches (Male) or 16 inches (Female)? 1 filed at 10/25/2024 0928   Gender - Male 1=Yes filed at 10/25/2024 0928   STOP-BANG Total Score 5 filed at 10/25/2024 0928          Prodigy: High Risk  Total Score: 8              Prodigy Gender Score          ARISCAT Score for Postoperative Pulmonary Complications    No data to display       Bui Perioperative Risk for Myocardial Infarction or Cardiac Arrest (BRI)    No data to display         Assessment and Plan:   # Asthma - continue inhalers  # HTN - hold lisinopril evening before and day of surgery  # Carpal tunnel syndrome -  Right carpal tunnel release w/Dr Martinez on 11/5/2024    Ecg performed today - Sinus bradycardia w/sinus arrhythmia, Voltage criteria for Left ventricular hypertrophy, Nonspecific T wave abnormality (54 bpm)  Medical hx, Allergies, VS and Labs reviewed  Medications addressed w/pre-op instructions provided

## 2024-10-30 LAB
ATRIAL RATE: 54 BPM
P AXIS: 63 DEGREES
P OFFSET: 153 MS
P ONSET: 126 MS
PR INTERVAL: 190 MS
Q ONSET: 221 MS
QRS COUNT: 8 BEATS
QRS DURATION: 88 MS
QT INTERVAL: 458 MS
QTC CALCULATION(BAZETT): 434 MS
QTC FREDERICIA: 441 MS
R AXIS: -24 DEGREES
T AXIS: -8 DEGREES
T OFFSET: 450 MS
VENTRICULAR RATE: 54 BPM

## 2024-11-05 ENCOUNTER — HOSPITAL ENCOUNTER (OUTPATIENT)
Facility: HOSPITAL | Age: 59
Setting detail: OUTPATIENT SURGERY
Discharge: HOME | End: 2024-11-05
Attending: ORTHOPAEDIC SURGERY | Admitting: ORTHOPAEDIC SURGERY
Payer: COMMERCIAL

## 2024-11-05 ENCOUNTER — ANESTHESIA (OUTPATIENT)
Dept: OPERATING ROOM | Facility: HOSPITAL | Age: 59
End: 2024-11-05
Payer: COMMERCIAL

## 2024-11-05 ENCOUNTER — ANESTHESIA EVENT (OUTPATIENT)
Dept: OPERATING ROOM | Facility: HOSPITAL | Age: 59
End: 2024-11-05
Payer: COMMERCIAL

## 2024-11-05 VITALS
WEIGHT: 222.22 LBS | DIASTOLIC BLOOD PRESSURE: 68 MMHG | HEIGHT: 70 IN | BODY MASS INDEX: 31.81 KG/M2 | SYSTOLIC BLOOD PRESSURE: 115 MMHG | OXYGEN SATURATION: 98 % | HEART RATE: 65 BPM | RESPIRATION RATE: 18 BRPM | TEMPERATURE: 98.2 F

## 2024-11-05 DIAGNOSIS — M65.341 TRIGGER RING FINGER OF RIGHT HAND: ICD-10-CM

## 2024-11-05 DIAGNOSIS — G56.01 CARPAL TUNNEL SYNDROME ON RIGHT: Primary | ICD-10-CM

## 2024-11-05 DIAGNOSIS — M65.331 TRIGGER FINGER, RIGHT MIDDLE FINGER: ICD-10-CM

## 2024-11-05 PROCEDURE — 2500000004 HC RX 250 GENERAL PHARMACY W/ HCPCS (ALT 636 FOR OP/ED): Performed by: ORTHOPAEDIC SURGERY

## 2024-11-05 PROCEDURE — 2500000004 HC RX 250 GENERAL PHARMACY W/ HCPCS (ALT 636 FOR OP/ED): Performed by: NURSE ANESTHETIST, CERTIFIED REGISTERED

## 2024-11-05 PROCEDURE — 3600000003 HC OR TIME - INITIAL BASE CHARGE - PROCEDURE LEVEL THREE: Performed by: ORTHOPAEDIC SURGERY

## 2024-11-05 PROCEDURE — 7100000010 HC PHASE TWO TIME - EACH INCREMENTAL 1 MINUTE: Performed by: ORTHOPAEDIC SURGERY

## 2024-11-05 PROCEDURE — 64721 CARPAL TUNNEL SURGERY: CPT | Performed by: ORTHOPAEDIC SURGERY

## 2024-11-05 PROCEDURE — 3700000001 HC GENERAL ANESTHESIA TIME - INITIAL BASE CHARGE: Performed by: ORTHOPAEDIC SURGERY

## 2024-11-05 PROCEDURE — 26055 INCISE FINGER TENDON SHEATH: CPT | Performed by: ORTHOPAEDIC SURGERY

## 2024-11-05 PROCEDURE — 7100000009 HC PHASE TWO TIME - INITIAL BASE CHARGE: Performed by: ORTHOPAEDIC SURGERY

## 2024-11-05 PROCEDURE — 3700000002 HC GENERAL ANESTHESIA TIME - EACH INCREMENTAL 1 MINUTE: Performed by: ORTHOPAEDIC SURGERY

## 2024-11-05 PROCEDURE — A64721 PR REVISE MEDIAN N/CARPAL TUNNEL SURG: Performed by: ANESTHESIOLOGY

## 2024-11-05 PROCEDURE — 3600000008 HC OR TIME - EACH INCREMENTAL 1 MINUTE - PROCEDURE LEVEL THREE: Performed by: ORTHOPAEDIC SURGERY

## 2024-11-05 PROCEDURE — 2720000007 HC OR 272 NO HCPCS: Performed by: ORTHOPAEDIC SURGERY

## 2024-11-05 PROCEDURE — A64721 PR REVISE MEDIAN N/CARPAL TUNNEL SURG: Performed by: NURSE ANESTHETIST, CERTIFIED REGISTERED

## 2024-11-05 RX ORDER — SODIUM CHLORIDE, SODIUM LACTATE, POTASSIUM CHLORIDE, CALCIUM CHLORIDE 600; 310; 30; 20 MG/100ML; MG/100ML; MG/100ML; MG/100ML
100 INJECTION, SOLUTION INTRAVENOUS CONTINUOUS
Status: DISCONTINUED | OUTPATIENT
Start: 2024-11-05 | End: 2024-11-05 | Stop reason: HOSPADM

## 2024-11-05 RX ORDER — ALBUTEROL SULFATE 0.83 MG/ML
2.5 SOLUTION RESPIRATORY (INHALATION) ONCE AS NEEDED
Status: DISCONTINUED | OUTPATIENT
Start: 2024-11-05 | End: 2024-11-05 | Stop reason: HOSPADM

## 2024-11-05 RX ORDER — MEPERIDINE HYDROCHLORIDE 25 MG/ML
12.5 INJECTION INTRAMUSCULAR; INTRAVENOUS; SUBCUTANEOUS EVERY 10 MIN PRN
Status: DISCONTINUED | OUTPATIENT
Start: 2024-11-05 | End: 2024-11-05 | Stop reason: HOSPADM

## 2024-11-05 RX ORDER — FENTANYL CITRATE 50 UG/ML
INJECTION, SOLUTION INTRAMUSCULAR; INTRAVENOUS CONTINUOUS PRN
Status: DISCONTINUED | OUTPATIENT
Start: 2024-11-05 | End: 2024-11-05

## 2024-11-05 RX ORDER — ACETAMINOPHEN 325 MG/1
650 TABLET ORAL EVERY 4 HOURS PRN
Status: DISCONTINUED | OUTPATIENT
Start: 2024-11-05 | End: 2024-11-05 | Stop reason: HOSPADM

## 2024-11-05 RX ORDER — LIDOCAINE HYDROCHLORIDE 10 MG/ML
0.1 INJECTION, SOLUTION INFILTRATION; PERINEURAL ONCE
Status: DISCONTINUED | OUTPATIENT
Start: 2024-11-05 | End: 2024-11-05 | Stop reason: HOSPADM

## 2024-11-05 RX ORDER — BUPIVACAINE HYDROCHLORIDE 5 MG/ML
10 INJECTION, SOLUTION EPIDURAL; INTRACAUDAL ONCE
Status: DISCONTINUED | OUTPATIENT
Start: 2024-11-05 | End: 2024-11-05 | Stop reason: HOSPADM

## 2024-11-05 RX ORDER — ONDANSETRON HYDROCHLORIDE 2 MG/ML
INJECTION, SOLUTION INTRAVENOUS AS NEEDED
Status: DISCONTINUED | OUTPATIENT
Start: 2024-11-05 | End: 2024-11-05

## 2024-11-05 RX ORDER — BUPIVACAINE HYDROCHLORIDE 5 MG/ML
INJECTION, SOLUTION PERINEURAL AS NEEDED
Status: DISCONTINUED | OUTPATIENT
Start: 2024-11-05 | End: 2024-11-05 | Stop reason: HOSPADM

## 2024-11-05 RX ORDER — SODIUM CHLORIDE, SODIUM LACTATE, POTASSIUM CHLORIDE, CALCIUM CHLORIDE 600; 310; 30; 20 MG/100ML; MG/100ML; MG/100ML; MG/100ML
20 INJECTION, SOLUTION INTRAVENOUS CONTINUOUS
Status: DISCONTINUED | OUTPATIENT
Start: 2024-11-05 | End: 2024-11-05 | Stop reason: HOSPADM

## 2024-11-05 RX ORDER — MIDAZOLAM HYDROCHLORIDE 1 MG/ML
1 INJECTION, SOLUTION INTRAMUSCULAR; INTRAVENOUS ONCE AS NEEDED
Status: DISCONTINUED | OUTPATIENT
Start: 2024-11-05 | End: 2024-11-05 | Stop reason: HOSPADM

## 2024-11-05 RX ORDER — ONDANSETRON HYDROCHLORIDE 2 MG/ML
4 INJECTION, SOLUTION INTRAVENOUS ONCE AS NEEDED
Status: DISCONTINUED | OUTPATIENT
Start: 2024-11-05 | End: 2024-11-05 | Stop reason: HOSPADM

## 2024-11-05 RX ORDER — HYDRALAZINE HYDROCHLORIDE 20 MG/ML
5 INJECTION INTRAMUSCULAR; INTRAVENOUS EVERY 30 MIN PRN
Status: DISCONTINUED | OUTPATIENT
Start: 2024-11-05 | End: 2024-11-05 | Stop reason: HOSPADM

## 2024-11-05 RX ORDER — MIDAZOLAM HYDROCHLORIDE 1 MG/ML
INJECTION, SOLUTION INTRAMUSCULAR; INTRAVENOUS AS NEEDED
Status: DISCONTINUED | OUTPATIENT
Start: 2024-11-05 | End: 2024-11-05

## 2024-11-05 RX ORDER — LABETALOL HYDROCHLORIDE 5 MG/ML
5 INJECTION, SOLUTION INTRAVENOUS ONCE AS NEEDED
Status: DISCONTINUED | OUTPATIENT
Start: 2024-11-05 | End: 2024-11-05 | Stop reason: HOSPADM

## 2024-11-05 RX ORDER — PROPOFOL 10 MG/ML
INJECTION, EMULSION INTRAVENOUS CONTINUOUS PRN
Status: DISCONTINUED | OUTPATIENT
Start: 2024-11-05 | End: 2024-11-05

## 2024-11-05 RX ORDER — LIDOCAINE HYDROCHLORIDE 20 MG/ML
INJECTION, SOLUTION EPIDURAL; INFILTRATION; INTRACAUDAL; PERINEURAL AS NEEDED
Status: DISCONTINUED | OUTPATIENT
Start: 2024-11-05 | End: 2024-11-05

## 2024-11-05 SDOH — HEALTH STABILITY: MENTAL HEALTH: CURRENT SMOKER: 0

## 2024-11-05 ASSESSMENT — PAIN SCALES - GENERAL
PAINLEVEL_OUTOF10: 0 - NO PAIN
PAINLEVEL_OUTOF10: 3
PAINLEVEL_OUTOF10: 3
PAIN_LEVEL: 0

## 2024-11-05 ASSESSMENT — COLUMBIA-SUICIDE SEVERITY RATING SCALE - C-SSRS
6. HAVE YOU EVER DONE ANYTHING, STARTED TO DO ANYTHING, OR PREPARED TO DO ANYTHING TO END YOUR LIFE?: NO
2. HAVE YOU ACTUALLY HAD ANY THOUGHTS OF KILLING YOURSELF?: NO
1. IN THE PAST MONTH, HAVE YOU WISHED YOU WERE DEAD OR WISHED YOU COULD GO TO SLEEP AND NOT WAKE UP?: NO

## 2024-11-05 ASSESSMENT — PAIN DESCRIPTION - DESCRIPTORS: DESCRIPTORS: DULL;SORE

## 2024-11-05 ASSESSMENT — PAIN - FUNCTIONAL ASSESSMENT
PAIN_FUNCTIONAL_ASSESSMENT: 0-10
PAIN_FUNCTIONAL_ASSESSMENT: 0-10

## 2024-11-05 NOTE — OP NOTE
RIGHT CARPAL TUNNEL RELEASE (R), RIGHT MIDDLE FINGER A-1 PULLEY RELEASE & RIGHT RING FINGER A-1 PULLEY RELEASE (R) Operative Note     Date: 2024  OR Location: [facility] OR    Name: Ha Hdz, : 1965, Age: 59 y.o., MRN: [id number], Sex: male    Diagnosis  Pre-op Diagnosis      * Carpal tunnel syndrome on right [G56.01]     * Trigger ring finger of right hand [M65.341]     * Trigger finger, right middle finger [M65.331] Post-op Diagnosis     * Carpal tunnel syndrome on right [G56.01]     * Trigger ring finger of right hand [M65.341]     * Trigger finger, right middle finger [M65.331]     Procedures  RIGHT CARPAL TUNNEL RELEASE  22059 - [facility] NEUROPLASTY &/TRANSPOS MEDIAN NRV CARPAL TUNNE    RIGHT MIDDLE FINGER A-1 PULLEY RELEASE & RIGHT RING FINGER A-1 PULLEY RELEASE  08975 - [facility] TENDON SHEATH INCISION      Surgeons      * Mervin Martinez - Primary    Resident/Fellow/Other Assistant:  Surgeons and Role:  * No surgeons found with a matching role *    Staff:   Carlos Alberto: Lorena  Circulator: Mayelin Pantoja Person: Zhane  Surgical Assistant: Moris  Surgical Assistant: Paul    Anesthesia Staff: Anesthesiologist: Paramjit Augustine MD  CRNA: LEONORA Deleon-CRNA    Procedure Summary  Anesthesia: Monitor Anesthesia Care  ASA: II  Estimated Blood Loss: 3 mL  Intra-op Medications: * Intraprocedure medication information is unavailable because the case start and end events have not been set *           Anesthesia Record               Intraprocedure I/O Totals       None           Specimen: No specimens collected              Drains and/or Catheters: * None in log *    Tourniquet Times:   16 minutes at 250 mmHg on right forearm    Implants:     Findings: Thickened A1 pulleys and thickened transverse carpal ligament    Indications: Ha Hdz is an 59 y.o. male who is having surgery for Carpal tunnel syndrome on right [G56.01]  Trigger ring finger of right hand [M65.341]  Trigger middle finger of right  hand [M65.331].  Patient had presented with right hand pain and numbness consistent with carpal tunnel syndrome in his right hand.  He had electrodiagnostic test which are confirmatory.  He also had pain and locking in his right middle and ring finger secondary to trigger digits.  He had tried and failed conservative management for all 3 problems.  We then discussed surgical treatment options for all 3.  I discussed with him in detail the risk, benefits alternatives of a right carpal tunnel release as well as trigger releases at his right middle and ring fingers.  I explained to him that the risks of the procedure include but are not limited to infection, damage to nerve tendon blood vessels, postoperative pain and stiffness, continued numbness and paresthesias, as well as risks associate with anesthesia.  The patient voiced understanding and informed consent was obtained.    The patient was seen in the preoperative area. The risks, benefits, complications, treatment options, non-operative alternatives, expected recovery and outcomes were discussed with the patient. The possibilities of reaction to medication, pulmonary aspiration, injury to surrounding structures, bleeding, recurrent infection, the need for additional procedures, failure to diagnose a condition, and creating a complication requiring transfusion or operation were discussed with the patient. The patient concurred with the proposed plan, giving informed consent.  The site of surgery was properly noted/marked if necessary per policy. The patient has been actively warmed in preoperative area. Preoperative antibiotics are not indicated. Venous thrombosis prophylaxis are not indicated.    Procedure Details: The patient was properly identified in the preoperative waiting area and his right hand as well as his right middle and ring fingers were marked as site of surgery.  Patient was taken back to the operating room suite placed supine on the OR table.  A  nonsterile tourniquet was applied to the patient's right forearm.  After intravenous sedation was administered the patient's right upper extremity was prepped and draped in the usual sterile fashion.  A preoperative verification timeout was taken.  At this point 20 mL of half percent plain Marcaine was injected near the 3 proposed incision sites.  Patient's right upper extremity was then exsanguinated with an Esmarch bandage and the tourniquet inflated to 250 mmHg.  I began with a carpal tunnel release.  Approximately 1.5 inch longitudinal incision was made in the base of the palm in line with the third webspace.  Sharp dissection was carried through skin and subcutaneous tissue.  Electrocautery was used to achieve hemostasis.  Identified the palmar fascia and sharply split this.  I then identified the transverse carpal ligament and sharply transected this with a 15 blade.  The antebrachial fascia was released proximally.  Full decompression of the median nerve was confirmed.  Wound was irrigated with normal saline.  Skin was closed with 4-0 nylon suture.    Approximately 1.5 cm longitudinal incision was made over the A1 pulley to the middle finger.  Sharp dissection was carried down to the A1 pulley.  This was sharply transected.  Wound was irrigated with normal saline.  Skin was closed with 4-0 nylon suture.    Another approximately 1.5 cm long to 2 incision was made over the A1 pulley to the ring finger.  Sharp dissection was carried down to the A1 pulley.  This was transected.  Wound was irrigated with normal saline.  Skin was closed with 4 nylon suture.  Tourniquet was let down after 16 minutes.  Good vascular return was seen in the patient's right hand and all digits.  Sterile bandages consisting of Xeroform, gauze 4 x 4's, Webril and Ace wrap was applied to the patient's right hand.  Patient was awakened from anesthesia and returned to recovery in stable condition.  There are no complications in the case.   All sponge and needle counts were correct at the end of the case.  Complications:  None; patient tolerated the procedure well.    Disposition: PACU - hemodynamically stable.  Condition: stable         Additional Details: None    Attending Attestation: I performed the procedure.    Mervin Martinez  Phone Number: [phone]

## 2024-11-05 NOTE — ANESTHESIA PREPROCEDURE EVALUATION
Patient: Ha Hdz    Procedure Information       Date/Time: 11/05/24 1210    Procedures:       RIGHT CARPAL TUNNEL RELEASE (Right: Wrist)      RIGHT MIDDLE FINGER A-1 PULLEY RELEASE & RIGHT RING FINGER A-1 PULLEY RELEASE (Right: Hand)    Location: [facility] OR 07 / Virtual [facility]    Surgeons: Mervin Martinez MD            Relevant Problems   Anesthesia (within normal limits)      Neuro   (+) Carpal tunnel syndrome on right      Musculoskeletal   (+) Carpal tunnel syndrome on right       Clinical information reviewed:    Allergies  Meds               NPO Detail:  NPO/Void Status  Date of Last Liquid: 11/05/24  Time of Last Liquid: 0800  Date of Last Solid: 11/04/24  Time of Last Solid: 2100         Physical Exam    Airway  Mallampati: II  TM distance: >3 FB  Neck ROM: full     Cardiovascular   Rhythm: regular  Rate: normal     Dental - normal exam     Pulmonary    Abdominal        Anesthesia Plan    History of general anesthesia?: yes  History of complications of general anesthesia?: no    ASA 2     MAC     The patient is not a current smoker.  Patient was not previously instructed to abstain from smoking on day of procedure.  Patient did not smoke on day of procedure.    intravenous induction   Anesthetic plan and risks discussed with patient.  Use of blood products discussed with patient who.    Plan discussed with CRNA.

## 2024-11-05 NOTE — ANESTHESIA POSTPROCEDURE EVALUATION
Patient: Ha Hdz    Procedure Summary       Date: 11/05/24 Room / Location: [facility] / [facility]    Anesthesia Start: 1239 Anesthesia Stop: 1321    Procedures:       RIGHT CARPAL TUNNEL RELEASE (Right: Wrist)      RIGHT MIDDLE FINGER A-1 PULLEY RELEASE & RIGHT RING FINGER A-1 PULLEY RELEASE (Right: Hand) Diagnosis:       Carpal tunnel syndrome on right      Trigger ring finger of right hand      Trigger finger, right middle finger      (Carpal tunnel syndrome on right [G56.01])      (Trigger ring finger of right hand [M65.341])      (Trigger middle finger of right hand [M65.331])    Surgeons: Mervin Martinez MD Responsible Provider: Paramjit Augustine MD    Anesthesia Type: MAC ASA Status: 2            Anesthesia Type: MAC    Vitals Value Taken Time   /68 11/05/24 1415   Temp 36.8 °C (98.2 °F) 11/05/24 1321   Pulse 61 11/05/24 1424   Resp 16 11/05/24 1425   SpO2 98 % 11/05/24 1424   Vitals shown include unfiled device data.    Anesthesia Post Evaluation    Patient location during evaluation: PACU  Patient participation: complete - patient participated  Level of consciousness: awake and alert  Pain score: 0  Pain management: adequate  Airway patency: patent  Cardiovascular status: acceptable  Respiratory status: acceptable  Hydration status: acceptable  Postoperative Nausea and Vomiting: none      No notable events documented.

## 2024-11-05 NOTE — BRIEF OP NOTE
Date: 2024  OR Location: [facility]    Name: Ha Hdz, : 1965, Age: 59 y.o., MRN: [id number], Sex: male    Diagnosis  Pre-op Diagnosis      * Carpal tunnel syndrome on right [G56.01]     * Trigger ring finger of right hand [M65.341]     * Trigger finger, right middle finger [M65.331] Post-op Diagnosis     * Carpal tunnel syndrome on right [G56.01]     * Trigger ring finger of right hand [M65.341]     * Trigger finger, right middle finger [M65.331]     Procedures  RIGHT CARPAL TUNNEL RELEASE  [id number] - [place] NEUROPLASTY &/TRANSPOS MEDIAN NRV CARPAL TUNNE    RIGHT MIDDLE FINGER A-1 PULLEY RELEASE & RIGHT RING FINGER A-1 PULLEY RELEASE  [id number] - [place] TENDON SHEATH INCISION      Surgeons      * Mervin Martinez - Primary    Resident/Fellow/Other Assistant:  Surgeons and Role:  * No surgeons found with a matching role *    Staff:   Circulator: Lorena  Circulator: Mayelin Pantoja Person: Zhane  Surgical Assistant: Moris  Surgical Assistant: Paul    Anesthesia Staff: Anesthesiologist: Paramjit Augustine MD  CRNA: LEONORA Deleon-CRNA    Procedure Summary  Anesthesia: Monitor Anesthesia Care  ASA: II  Estimated Blood Loss: 3 mL  Intra-op Medications: * Intraprocedure medication information is unavailable because the case start and end events have not been set *           Anesthesia Record               Intraprocedure I/O Totals       None           Specimen: No specimens collected               Findings: Thickened A1 pulleys and thickened transverse carpal ligament    Complications:  None; patient tolerated the procedure well.     Disposition: PACU - hemodynamically stable.  Condition: stable  Specimens Collected: No specimens collected  Attending Attestation: I performed the procedure.    Mervin Martinez  Phone Number: [phone]

## 2024-11-08 ENCOUNTER — TELEPHONE (OUTPATIENT)
Dept: ORTHOPEDIC SURGERY | Facility: CLINIC | Age: 59
End: 2024-11-08
Payer: COMMERCIAL

## 2024-11-08 NOTE — TELEPHONE ENCOUNTER
Patient calling in regarding surgery on 11/5 [facility] states his pain is worse now than before and fingers are very numb still and struggling to move middle finger, can move ring finger.     He is wondering what to do, wants a call back please.

## 2024-11-18 ENCOUNTER — OFFICE VISIT (OUTPATIENT)
Dept: ORTHOPEDIC SURGERY | Facility: CLINIC | Age: 59
End: 2024-11-18
Payer: COMMERCIAL

## 2024-11-18 ENCOUNTER — HOSPITAL ENCOUNTER (OUTPATIENT)
Facility: HOSPITAL | Age: 59
Setting detail: OUTPATIENT SURGERY
End: 2024-11-18
Attending: ORTHOPAEDIC SURGERY | Admitting: ORTHOPAEDIC SURGERY
Payer: COMMERCIAL

## 2024-11-18 DIAGNOSIS — G56.02 CARPAL TUNNEL SYNDROME ON LEFT: ICD-10-CM

## 2024-11-18 DIAGNOSIS — M65.331 TRIGGER MIDDLE FINGER OF RIGHT HAND: ICD-10-CM

## 2024-11-18 DIAGNOSIS — M65.341 TRIGGER RING FINGER OF RIGHT HAND: ICD-10-CM

## 2024-11-18 DIAGNOSIS — G56.01 CARPAL TUNNEL SYNDROME ON RIGHT: Primary | ICD-10-CM

## 2024-11-18 PROCEDURE — 99211 OFF/OP EST MAY X REQ PHY/QHP: CPT | Performed by: ORTHOPAEDIC SURGERY

## 2024-11-18 RX ORDER — METHYLPREDNISOLONE 4 MG/1
4 TABLET ORAL ONCE
Qty: 21 TABLET | Refills: 0 | Status: SHIPPED | OUTPATIENT
Start: 2024-11-18 | End: 2024-11-18

## 2024-11-18 NOTE — PROGRESS NOTES
Subjective    Patient ID: Ha Hdz is a 59 y.o. male.    Chief Complaint: Postop Visit (RIGHT CARPAL TUNNEL RELEASE/RIGHT MIDDLE FINGER A-1 PULLEY RELEASE & RIGHT RING FINGER A-1 PULLEY RELEASE/DOS 11/5/24)     Last Surgery: Right Carpal Tunnel Release - Right and Right Middle Finger A-1 Pulley Release & Right Ring Finger A-1 Pulley Release - Right  Last Surgery Date: 11/5/2024    HPI  The patient comes in first postoperative visit after undergoing a right carpal tunnel release and trigger releases at his right middle and ring fingers.  He still notices significant swelling in his hand.  His nighttime symptoms have improved but he still does notice numbness in his middle and ring fingers.    Objective   Ortho Exam  The patient is in no acute distress.  Exam of his right hand reveals all of his incisions are well-healed.  There is no evidence of infection.  Sutures removed.  He still has moderate swelling in his right hand.  This is limiting his ability to fully extend and flex his digits.    Assessment/Plan   Encounter Diagnoses:  Carpal tunnel syndrome on right    Trigger middle finger of right hand    Trigger ring finger of right hand    Carpal tunnel syndrome on left    Orders Placed This Encounter    methylPREDNISolone (Medrol Dospak) 4 mg tablets     The patient is slowly improving following his right carpal tunnel release and multiple trigger releases.  He was reassured takes more than just 2 weeks for the nerve to heal.  However given the amount of swelling I did prescribe him a Medrol Dosepak.  The patient will be calling to schedule surgery on his left hand.

## 2024-12-03 ENCOUNTER — PRE-ADMISSION TESTING (OUTPATIENT)
Dept: PREADMISSION TESTING | Facility: HOSPITAL | Age: 59
End: 2024-12-03
Payer: COMMERCIAL

## 2024-12-06 ENCOUNTER — TELEPHONE (OUTPATIENT)
Dept: ORTHOPEDIC SURGERY | Facility: CLINIC | Age: 59
End: 2024-12-06
Payer: COMMERCIAL

## 2024-12-06 DIAGNOSIS — G56.02 CARPAL TUNNEL SYNDROME ON LEFT: Primary | ICD-10-CM

## 2024-12-06 RX ORDER — METHYLPREDNISOLONE 4 MG/1
4 TABLET ORAL ONCE
Qty: 21 TABLET | Refills: 0 | Status: SHIPPED | OUTPATIENT
Start: 2024-12-06 | End: 2024-12-06

## 2024-12-06 NOTE — TELEPHONE ENCOUNTER
Pt called and would like to talk to you in regards to his surgery. He stated that he is still numb can't make a fist and still in pain. Please call at your convenience.      [phone]

## 2024-12-10 ENCOUNTER — PRE-ADMISSION TESTING (OUTPATIENT)
Dept: PREADMISSION TESTING | Facility: HOSPITAL | Age: 59
End: 2024-12-10
Payer: COMMERCIAL

## 2024-12-10 VITALS
DIASTOLIC BLOOD PRESSURE: 90 MMHG | HEART RATE: 53 BPM | OXYGEN SATURATION: 99 % | HEIGHT: 70 IN | RESPIRATION RATE: 18 BRPM | TEMPERATURE: 95.2 F | BODY MASS INDEX: 31.56 KG/M2 | SYSTOLIC BLOOD PRESSURE: 156 MMHG | WEIGHT: 220.46 LBS

## 2024-12-10 DIAGNOSIS — Z01.818 PREOP TESTING: Primary | ICD-10-CM

## 2024-12-10 PROCEDURE — 99244 OFF/OP CNSLTJ NEW/EST MOD 40: CPT

## 2024-12-10 RX ORDER — SULFAMETHOXAZOLE AND TRIMETHOPRIM 400; 80 MG/1; MG/1
1 TABLET ORAL 2 TIMES DAILY
COMMUNITY

## 2024-12-10 ASSESSMENT — DUKE ACTIVITY SCORE INDEX (DASI)
CAN YOU TAKE CARE OF YOURSELF (EAT, DRESS, BATHE, OR USE TOILET): YES
CAN YOU DO HEAVY WORK AROUND THE HOUSE LIKE SCRUBBING FLOORS OR LIFTING AND MOVING HEAVY FURNITURE: NO
DASI METS SCORE: 7.3
CAN YOU DO MODERATE WORK AROUND THE HOUSE LIKE VACUUMING, SWEEPING FLOORS OR CARRYING GROCERIES: YES
CAN YOU WALK A BLOCK OR TWO ON LEVEL GROUND: YES
CAN YOU WALK INDOORS, SUCH AS AROUND YOUR HOUSE: YES
TOTAL_SCORE: 36.7
CAN YOU PARTICIPATE IN MODERATE RECREATIONAL ACTIVITIES LIKE GOLF, BOWLING, DANCING, DOUBLES TENNIS OR THROWING A BASEBALL OR FOOTBALL: NO
CAN YOU DO LIGHT WORK AROUND THE HOUSE LIKE DUSTING OR WASHING DISHES: YES
CAN YOU HAVE SEXUAL RELATIONS: YES
CAN YOU DO YARD WORK LIKE RAKING LEAVES, WEEDING OR PUSHING A MOWER: YES
CAN YOU RUN A SHORT DISTANCE: YES
CAN YOU CLIMB A FLIGHT OF STAIRS OR WALK UP A HILL: YES
CAN YOU PARTICIPATE IN STRENOUS SPORTS LIKE SWIMMING, SINGLES TENNIS, FOOTBALL, BASKETBALL, OR SKIING: NO

## 2024-12-10 ASSESSMENT — PAIN DESCRIPTION - DESCRIPTORS: DESCRIPTORS: ACHING;BURNING

## 2024-12-10 ASSESSMENT — PAIN - FUNCTIONAL ASSESSMENT: PAIN_FUNCTIONAL_ASSESSMENT: 0-10

## 2024-12-10 ASSESSMENT — PAIN SCALES - GENERAL: PAINLEVEL_OUTOF10: 5 - MODERATE PAIN

## 2024-12-10 NOTE — PREPROCEDURE INSTRUCTIONS
Medication List            Accurate as of December 10, 2024 11:19 AM. Always use your most recent med list.                * ProAir HFA 90 mcg/actuation inhaler  Generic drug: albuterol  Medication Adjustments for Surgery: Take on the morning of surgery     * albuterol 90 mcg/actuation inhaler  Medication Adjustments for Surgery: Take on the morning of surgery     cetirizine 10 mg tablet  Commonly known as: ZyrTEC  Medication Adjustments for Surgery: Take last dose 1 day (24 hours) before surgery     EPINEPHrine 0.3 mg/0.3 mL injection syringe  Commonly known as: Epipen  Medication Adjustments for Surgery: Take/Use as prescribed      mg tablet  Generic drug: ibuprofen  Additional Medication Adjustments for Surgery: Take last dose 7 days before surgery     lisinopril 10 mg tablet  Medication Adjustments for Surgery: Take last dose 1 day (24 hours) before surgery     methylPREDNISolone 4 mg tablets  Commonly known as: Medrol Dospak  Take 1 tablet (4 mg) by mouth 1 time for 1 dose. Use as directed by package instructions  Medication Adjustments for Surgery: Take/Use as prescribed     sulfamethoxazole-trimethoprim 400-80 mg tablet  Commonly known as: Bactrim  Medication Adjustments for Surgery: Take/Use as prescribed     Symbicort 160-4.5 mcg/actuation inhaler  Generic drug: budesonide-formoteroL  Medication Adjustments for Surgery: Take on the morning of surgery     tiZANidine 4 mg tablet  Commonly known as: Zanaflex  Medication Adjustments for Surgery: Take/Use as prescribed           * This list has 2 medication(s) that are the same as other medications prescribed for you. Read the directions carefully, and ask your doctor or other care provider to review them with you.                                  NPO Instructions:    Do not eat any food after midnight the night before your surgery/procedure.    Additional Instructions:     Day of Surgery:  You may have clear liquids until TWO hours before  surgery/procedure.  This includes water, black tea/coffee, (no milk or cream) apple juice and electrolyte drinks (Gatorade)  Wear  comfortable loose fitting clothing  Do not use moisturizers, creams, lotions or perfume  All jewelry and valuables should be left at home      PRE-OPERATIVE INSTRUCTIONS FOR SURGERY    *Do not eat anything after midnight the night of surgery.  This includes food of any kind (including hard candy, cough drops, mints).   You may have up to 13 ounces of clear liquid  until TWO hours prior to your scheduled surgery time, unless ERAS* protocol is ordered for you.  Clear liquids include water, black tea/coffee, (no milk or cream) apple juice and electrolyte drinks (GATORADE).  You may chew gum until TWO hours prior you your surgery/procedure.     *ERAS protocol: follow as instructed.  DO not drink an additional 13 ounces as noted above.        *One of our staff members will call you ONE business day before your surgery, between 11am-2 pm to let you know the time to arrive.  If you have not received a call by 2 pm, call [phone]  *When you arrive at the hospital-->GO TO Registration on the ground floor  *Stop smoking 24 hours prior to surgery.  No Marijuana, CBD Oil or Vaping for 48 hours  *No alcohol 24 hours prior to surgery  *You will need a responsible adult to drive you home  -No acrylic nails or nail polish on at least one fingernail, NO polish on toes for foot surgery  -You may be asked to remove your dentures, partial plate, eyeglasses or contact lenses before going to surgery.  Please bring a case for these items.  -Body piercings need to be removed.  Jewelry and valuables should be left at home.  -Put on loose,  comfortable, clean clothing, that will accommodate bandages      What you may be asked to bring to surgery:  Aerosol

## 2024-12-10 NOTE — CPM/PAT H&P
CPM/PAT Evaluation       Name: Ha Hdz (Ha Hdz)  /Age: 1965/59 y.o.     Visit Type:   In-Person       Chief Complaint: Left wrist pain requiring intervention    HPI  Patient is a 59-year-old AAM male with history of HTN, asthma, chronic back pain and carpal tunnel who presents today for preoperative evaluation.  Patient follows with Dr. Martinez for carpal tunnel.  He had operation on his right wrist earlier in November and he is scheduled for left carpal tunnel release on 2024 with Dr. Martinez.    Of note patient is in the ED over the weekend on  for a right shoulder superficial abscess.  Patient had I&D of the abscess in the ED and a moderate amount of purulent fluid was drained.  He had no leukocytosis. Patient was given antibiotics in ED and sent home with a prescription for 5-day course of Bactrim.  Otherwise, patient denies any fever, chills, increased drainage from incision, surrounding redness or warmth, shortness of breath, chest pain, fatigue, GI or urinary symptoms.  He does endorse right hand paresthesias and limited mobility as well as left hand limited mobility sharp shooting pain in the wrist and paresthesia.    Past Medical History:   Diagnosis Date    Anesthesia of skin     Numbness    Arthritis     Asthma     Joint pain     Other conditions influencing health status 2017    History of cough    Other specified disorders of nose and nasal sinuses 2017    Sinus pressure    Other specified disorders of nose and nasal sinuses 2019    Sinus pressure    Other specified health status     Known health problems: none    Personal history of other diseases of the musculoskeletal system and connective tissue     History of low back pain       Past Surgical History:   Procedure Laterality Date    CARPAL TUNNEL RELEASE      LUMBAR LAMINECTOMY      OTHER SURGICAL HISTORY  2019    Rotator cuff repair       Patient Sexual activity questions deferred to the  "physician.    Family History   Problem Relation Name Age of Onset    Cancer Father         Allergies   Allergen Reactions    Adhesive Other     bandaids burn skin    Penicillins Hives     Stated \"as child \"    Latex, Natural Rubber Rash       Prior to Admission medications    Medication Sig Start Date End Date Taking? Authorizing Provider   albuterol (ProAir HFA) 90 mcg/actuation inhaler Inhale 2 puffs every 4 hours if needed. 5/27/16  Yes Historical Provider, MD   albuterol 90 mcg/actuation inhaler Inhale 1-2 puffs every 6 hours if needed. 6/28/24  Yes Historical Provider, MD   budesonide-formoteroL (Symbicort) 160-4.5 mcg/actuation inhaler Inhale 2 puffs 2 times a day. 1/3/19  Yes Historical Provider, MD   cetirizine (ZyrTEC) 10 mg tablet Take 1 tablet (10 mg) by mouth once daily.   Yes Historical Provider, MD    mg tablet TAKE ONE TABLET BY MOUTH EVERY 6 HOURS AS NEEDED FOR PAIN FOR UP TO 10 DAYS   Yes Historical Provider, MD   lisinopril 10 mg tablet Take 1 tablet (10 mg) by mouth once daily.   Yes Historical Provider, MD   methylPREDNISolone (Medrol Dospak) 4 mg tablets Take 1 tablet (4 mg) by mouth 1 time for 1 dose. Use as directed by package instructions 12/6/24 12/10/24 Yes Mervin Martinez MD   sulfamethoxazole-trimethoprim (Bactrim) 400-80 mg tablet Take 1 tablet by mouth 2 times a day.   Yes Historical Provider, MD   EPINEPHrine 0.3 mg/0.3 mL injection syringe Inject 0.3 mL (0.3 mg) into the muscle. 6/28/24   Historical Provider, MD   tiZANidine (Zanaflex) 4 mg tablet Take 1 tablet (4 mg) by mouth every 6 hours if needed.  Patient not taking: Reported on 12/10/2024 4/1/24   Historical Provider, MD        A ten-point review of systems was completed and is otherwise negative except for what is mentioned in the HPI above.    Physical Exam:    GENERAL: Well developed, awake/alert/oriented x3, no distress, alert and cooperative  HEENT: MMM  NECK: Trachea midline, no lymphadenopathy  CARDIOVASCULAR: RRR, " normal S1 and S 2, no murmurs, 2+ equal pulses of the extremities  RESPIRATORY: Patent airways, CTAB, normal breath sounds with good chest expansion, thorax symmetric  ABDOMEN: Soft, non-tender, no distention  SKIN: Warm and dry  EXTREMITIES: No cyanosis, edema  NEURO: A&O x 3, normal motor and sensation, no focal deficits   PSYCH: Appropriate mood and behavior      PAT AIRWAY:   Airway:     Mallampati::  I    TM distance::  >3 FB    Neck ROM::  Full  normal          Visit Vitals  /90   Pulse 53   Temp 35.1 °C (95.2 °F) (Tympanic)   Resp 18       DASI Risk Score      Flowsheet Row Pre-Admission Testing from 12/10/2024 in [facility] Pre-Admission Testing from 10/25/2024 in [facility]   Can you take care of yourself (eat, dress, bathe, or use toilet)?  2.75 filed at 12/10/2024 1045 2.75 filed at 10/25/2024 0928   Can you walk indoors, such as around your house? 1.75 filed at 12/10/2024 1045 1.75 filed at 10/25/2024 0928   Can you walk a block or two on level ground?  2.75 filed at 12/10/2024 1045 2.75 filed at 10/25/2024 0928   Can you climb a flight of stairs or walk up a hill? 5.5 filed at 12/10/2024 1045 5.5 filed at 10/25/2024 0928   Can you run a short distance? 8 filed at 12/10/2024 1045 8 filed at 10/25/2024 0928   Can you do light work around the house like dusting or washing dishes? 2.7 filed at 12/10/2024 1045 2.7 filed at 10/25/2024 0928   Can you do moderate work around the house like vacuuming, sweeping floors or carrying groceries? 3.5 filed at 12/10/2024 1045 3.5 filed at 10/25/2024 0928   Can you do heavy work around the house like scrubbing floors or lifting and moving heavy furniture?  0 filed at 12/10/2024 1045 0 filed at 10/25/2024 0928   Can you do yard work like raking leaves, weeding or pushing a mower? 4.5 filed at 12/10/2024 1045 0 filed at 10/25/2024 0928   Can you have sexual relations? 5.25 filed at 12/10/2024 1045 5.25 filed at 10/25/2024 0928   Can you  participate in moderate recreational activities like golf, bowling, dancing, doubles tennis or throwing a baseball or football? 0 filed at 12/10/2024 1045 0 filed at 10/25/2024 0928   Can you participate in strenous sports like swimming, singles tennis, football, basketball, or skiing? 0 filed at 12/10/2024 1045 0 filed at 10/25/2024 0928   DASI SCORE 36.7 filed at 12/10/2024 1045 32.2 filed at 10/25/2024 0928   METS Score (Will be calculated only when all the questions are answered) 7.3 filed at 12/10/2024 1045 6.7 filed at 10/25/2024 0928          Caprini DVT Assessment    No data to display       Modified Frailty Index    No data to display       CHADS2 Stroke Risk  Current as of 2 hours ago        N/A 3 to 100%: High Risk   2 to < 3%: Medium Risk   0 to < 2%: Low Risk     Last Change: N/A          This score determines the patient's risk of having a stroke if the patient has atrial fibrillation.        This score is not applicable to this patient. Components are not calculated.          Revised Cardiac Risk Index    No data to display       Apfel Simplified Score    No data to display       Risk Analysis Index Results This Encounter    No data found in the last 10 encounters.       Stop Bang Score      Flowsheet Row Pre-Admission Testing from 12/10/2024 in [facility] Pre-Admission Testing from 10/25/2024 in [facility]   Do you snore loudly? 0 filed at 12/10/2024 1045 1 filed at 10/25/2024 0928   Do you often feel tired or fatigued after your sleep? 0 filed at 12/10/2024 1045 0 filed at 10/25/2024 0928   Has anyone ever observed you stop breathing in your sleep? 0 filed at 12/10/2024 1045 0 filed at 10/25/2024 0928   Do you have or are you being treated for high blood pressure? 1 filed at 12/10/2024 1045 1 filed at 10/25/2024 0928   Recent BMI (Calculated) 31.6 filed at 12/10/2024 1045 31.9 filed at 10/25/2024 0928   Is BMI greater than 35 kg/m2? 0=No filed at 12/10/2024 1045 0=No filed  at 10/25/2024 0928   Age older than 50 years old? 1=Yes filed at 12/10/2024 1045 1=Yes filed at 10/25/2024 0928   Is your neck circumference greater than 17 inches (Male) or 16 inches (Female)? 0 filed at 12/10/2024 1045 1 filed at 10/25/2024 0928   Gender - Male 1=Yes filed at 12/10/2024 1045 1=Yes filed at 10/25/2024 0928   STOP-BANG Total Score 3 filed at 12/10/2024 1045 5 filed at 10/25/2024 0928          Prodigy: High Risk  Total Score: 8              Prodigy Gender Score          ARISCAT Score for Postoperative Pulmonary Complications    No data to display       Bui Perioperative Risk for Myocardial Infarction or Cardiac Arrest (BRI)    No data to display         Assessment and Plan:   Patient is a 59-year-old AAM male with history of HTN, asthma, chronic back pain and carpal tunnel.    #Carpal tunnel of the left wrist  Scheduled for left carpal tunnel release on 12/13/2024 with Dr. Martinez.     #Recent shoulder abscess  S/p I&D on 12/7/2024 in ED at [place].  Patient had a moderate amount of purulent fluid drained from abscess with a 5-day course of Bactrim.  He had no leukocytosis or systemic symptoms of infection such as fever or chills.  On evaluation today incision has a scant amount of purulent drainage on the bandage but visible wound base is red.  There is an area of induration to his anterior portion of the wound, this is presumably where the pocket of fluid was.  There is no fluctuance, warmth or surrounding erythema.  Patient states the pain he had in his right shoulder is almost daily resolved.  > A secure chat message was sent to Dr. Martinez notifying him of findings    #HTN, Hx  Currently maintained on lisinopril    #Asthma, Hx  Not currently taking any routine inhalers or medications aside from Zyrtec.  He will occasionally use his Symbicort or rescue inhaler when symptomatic, which is historically once every few weeks    Labs from 12/7/2024 reviewed and grossly unremarkable aside from  glucose of 115.  EKG from 10/25/2024 reviewed and demonstrates sinus bradycardia with sinus arrhythmia; voltage criteria for left ventricular hypertrophy; nonspecific T wave abnormalities    I spent 40 minutes in the professional and overall care of this patient. Greater than 50% of this time was spent counseling patient, reviewing plan of care and discussing medication perioperative management.    Germania Handley, APRN-CNP

## 2024-12-11 ENCOUNTER — OFFICE VISIT (OUTPATIENT)
Dept: ORTHOPEDIC SURGERY | Facility: CLINIC | Age: 59
End: 2024-12-11
Payer: COMMERCIAL

## 2024-12-11 VITALS — HEIGHT: 70 IN | WEIGHT: 220 LBS | BODY MASS INDEX: 31.5 KG/M2

## 2024-12-11 DIAGNOSIS — G56.01 CARPAL TUNNEL SYNDROME ON RIGHT: ICD-10-CM

## 2024-12-11 DIAGNOSIS — M65.331 TRIGGER MIDDLE FINGER OF RIGHT HAND: Primary | ICD-10-CM

## 2024-12-11 DIAGNOSIS — M65.341 TRIGGER RING FINGER OF RIGHT HAND: ICD-10-CM

## 2024-12-11 PROCEDURE — 99211 OFF/OP EST MAY X REQ PHY/QHP: CPT | Performed by: ORTHOPAEDIC SURGERY

## 2024-12-11 PROCEDURE — 1036F TOBACCO NON-USER: CPT | Performed by: ORTHOPAEDIC SURGERY

## 2024-12-11 PROCEDURE — 3008F BODY MASS INDEX DOCD: CPT | Performed by: ORTHOPAEDIC SURGERY

## 2024-12-11 NOTE — PROGRESS NOTES
Subjective    Patient ID: Ha Hdz is a 59 y.o. male.    Chief Complaint: Follow-up (F/U RT CTS/)     Last Surgery: Right Carpal Tunnel Release - Right and Right Middle Finger A-1 Pulley Release & Right Ring Finger A-1 Pulley Release - Right  Last Surgery Date: 11/5/2024    HPI  Patient returns today for follow-up of his right carpal tunnel release as well as trigger digit releases at his middle and ring fingers.  He continues to have difficulty obtaining full range of motion.  He still has paresthesias at the tips of his middle and ring fingers as well.      Objective   Ortho Exam  The patient is in no acute distress.  All 3 incisions in his right hand are well-healed.  However he already has what appears to be hypertrophic scar tissue forming along the radial aspect of the carpal tunnel release incision.  He has full extension in his fingers.  However he has difficulty forming a tight fist.    Assessment/Plan   Encounter Diagnoses:  The patient continues to have stiffness following multiple procedures on his right hand.  At this time he will push back his left-sided surgery.  He will undergo occupational therapy for his right hand.  He will begin using his right hand is much as he can tolerate.   no

## 2024-12-23 ENCOUNTER — APPOINTMENT (OUTPATIENT)
Dept: ORTHOPEDIC SURGERY | Facility: CLINIC | Age: 59
End: 2024-12-23
Payer: COMMERCIAL

## 2024-12-26 ENCOUNTER — APPOINTMENT (OUTPATIENT)
Dept: OCCUPATIONAL THERAPY | Facility: HOSPITAL | Age: 59
End: 2024-12-26
Payer: COMMERCIAL

## 2024-12-26 ENCOUNTER — DOCUMENTATION (OUTPATIENT)
Dept: OCCUPATIONAL THERAPY | Facility: HOSPITAL | Age: 59
End: 2024-12-26
Payer: COMMERCIAL

## 2024-12-27 ENCOUNTER — PRE-ADMISSION TESTING (OUTPATIENT)
Dept: PREADMISSION TESTING | Facility: HOSPITAL | Age: 59
End: 2024-12-27
Payer: COMMERCIAL

## 2024-12-27 PROCEDURE — 99214 OFFICE O/P EST MOD 30 MIN: CPT | Performed by: NURSE PRACTITIONER

## 2025-01-06 ENCOUNTER — EVALUATION (OUTPATIENT)
Dept: OCCUPATIONAL THERAPY | Facility: HOSPITAL | Age: 60
End: 2025-01-06
Payer: COMMERCIAL

## 2025-01-06 DIAGNOSIS — M65.331 TRIGGER MIDDLE FINGER OF RIGHT HAND: ICD-10-CM

## 2025-01-06 DIAGNOSIS — G56.01 CARPAL TUNNEL SYNDROME ON RIGHT: Primary | ICD-10-CM

## 2025-01-06 DIAGNOSIS — M65.341 TRIGGER RING FINGER OF RIGHT HAND: ICD-10-CM

## 2025-01-06 PROCEDURE — 97110 THERAPEUTIC EXERCISES: CPT | Mod: GO | Performed by: OCCUPATIONAL THERAPIST

## 2025-01-06 PROCEDURE — 97165 OT EVAL LOW COMPLEX 30 MIN: CPT | Mod: GO | Performed by: OCCUPATIONAL THERAPIST

## 2025-01-06 PROCEDURE — 97140 MANUAL THERAPY 1/> REGIONS: CPT | Mod: GO | Performed by: OCCUPATIONAL THERAPIST

## 2025-01-06 ASSESSMENT — ENCOUNTER SYMPTOMS
LOSS OF SENSATION IN FEET: 0
DEPRESSION: 0
OCCASIONAL FEELINGS OF UNSTEADINESS: 0

## 2025-01-06 NOTE — PROGRESS NOTES
"    Occupational Therapy  Occupational Therapy Orthopedic Evaluation    Patient Name: Ha Hdz  MRN: [id number]  Today's Date: 1/6/2025  Time Calculation  Start Time: 1630  Stop Time: 1715  Time Calculation (min): 45 min      Time:  Time Calculation  Start Time: 1630  Stop Time: 1715  Time Calculation (min): 45 min  OT Evaluation Time Entry  OT Evaluation (Low) Time Entry: 20  OT Therapeutic Procedures Time Entry  Manual Therapy Time Entry: 8  Therapeutic Exercise Time Entry: 17    Insurance:  Visit number: 1 of 12  Authorization info: no auth/ 30 visits per year   Ins[facility] Type: [facility] / Plan: [employer] / Product Type: *No Product type* /    General:  Reason for visit: s/p R CTR; R RF and R LF trigger finger releases.   Referred by: KELVIN Martinez     Current Problem  1. Carpal tunnel syndrome on right  Referral to Occupational Therapy    Follow Up In Occupational Therapy      2. Trigger middle finger of right hand  Referral to Occupational Therapy    Follow Up In Occupational Therapy      3. Trigger ring finger of right hand  Referral to Occupational Therapy    Follow Up In Occupational Therapy          Precautions: none      Medical History Form: Reviewed (scanned into chart)    Subjective:   Chief Complaint: \"My hand is so stiff.\"  Onset: Patient reports progressive worsening of R hand pain, numbness, burning, stiffness, and weakness x 5 years. Patient reports have 8+ cortisone injections in R hand/wrist with no long lasting effects. Patient underwent SX repair on 11/5/24 involving R CTR and R LF & RF trigger finger releases. Patient reports being scheduled for L CTR on 1/10/25.   PEARL: Overuse   DOI/DOS: DOI=1/6/20; DOS=11/5/24      Hand Dominance: Right    Current Condition since injury:   worse     PAIN  Pain Assessment: 0-10  0-10 (Numeric) Pain Score: 6  Pain Location: Hand  Pain Orientation: Right  Pain Radiating Towards: wrist  Aggravating Factors: Gripping/pinching, Finger/Thumb Flexion, " "and Finger/Thumb extension   Relieving Factors: Rest     Relevant Information (PMH & Previous Tests/Imaging): Hx of multiple cortisone injections(8+).   Previous Interventions/Treatments: Injections     Prior Level of Function (PLOF)  Exercise/Physical Activity: Patient reports being independent with all ADL's prior to injury.   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   Current ADL/IADL Status: All ADL's involving resistive use of R hand/wrist are impaired.      Patients Living Environment: Reviewed and no concern    Primary Language: English    There are no spiritual/cultural practices/values/needs that are important to know      Pt goals for therapy: \"Make my hand feel normal.\"    Red Flags: Do you have any of the following? No  Fever/chills, unexplained weight changes, dizziness/fainting, unexplained change in bowel or bladder functions, unexplained malaise or muscle weakness, night pain/sweats, numbness or tingling    Objective:  Evaluation Complexity=Low  Rehab Prognosis=Good    RIGHT HAND AROM (Degrees)   MCP PIP DIP CONTRERAS DPC   IF  0/75 0/80 0/60 215    MF 0/80 -27/80 0/60 193    RF 0/77 -20/80 0/65 202    SF 0/82 0/72 0/70 224    Thumb Opposition to SF       Thumb RABD        Thumb PABD            WRIST AROM (Degrees)   R L   Extension IMP NT   Flexion IMP NT   Radial Deviation IMP NT   Ulnar Deviation IMP NT   Pronation WFL NT   Supination WFL NT   L UE Not Tested due to +CTS      Special Tests    CTS  Tinels at Carpal tunnel: negative on R UE     Physical Observation: all incisions are intact. Dense scar tissue is noted along all 3 incisions.   Edema: minimal throughout all SX regions.     Sensory: Numbness is reported in pad of R LF.   Numbness/Tingling: Numbness is reported in pad of R LF.     Outcome Measures:  UEFI: 49/80    EDUCATION: home exercise program, plan of care, activity modifications, pain management, and injury pathology       Goals:  Active       OT Goals       " Patient c/o 2/10 or less pain in R hand/wrist with use during ADL's and all home exercises.        Start:  01/07/25    Expected End:  02/06/25            Patient is independent with entire HEP.        Start:  01/07/25    Expected End:  02/06/25            CONTRERAS of R IF, MF, and RF =220 or more to assist with functional grasp.        Start:  01/07/25    Expected End:  02/06/25            R distal UE AROM is sufficient for independent completion of all ADL's and advanced ADL's.        Start:  01/07/25    Expected End:  02/21/25            R distal UE strength is sufficient for independent completion of all ADL's and advanced ADL's.        Start:  01/07/25    Expected End:  02/21/25                Plan of care was developed with input and agreement by the patient    Treatments:     Therapeutic Exercise:   17 min  Therapeutic Exercise  Therapeutic Exercise Performed: Yes  Therapeutic Exercise Activity 1: tendon gliding x 10 reps (issued for HEP)  Therapeutic Exercise Activity 2: marker extension rolls x 10 (issued for HEP)  Therapeutic Exercise Activity 3: opposition x 10 reps (issued for HEP)    Manual Therapy:     8 min  Manual Therapy  Manual Therapy Performed: Yes  Manual Therapy Activity 1: scar massage and soft tissue mobilization to all 3 SX areas    Assessment: Patient is a 60 yo male  s/p R CTR and trigger finger releases of LF and RF resulting in limited participation in pain-free ADLs and inability to perform at their prior level of function. Pt would benefit from occupational therapy to address the impairments found & listed previously in the objective section in order to return to safe and pain-free ADLs and prior level of function.       Clinical Presentation: Evolving with changing characteristics       Plan:      Planned Interventions include: therapeutic exercise, therapeutic activity, self-care home management, manual therapy, therapeutic activities, gait training, neuromuscular coordination,  vasopneumatic, dry needling, aquatic therapy, electric stimulation, fluidotherapy, ultrasound, kinesiotaping, orthosis fabrication, wound care  Frequency: 1-2 x Week  Duration: 6 Weeks  Rehab potential/prognosis: Good       Ramila Dela Cruz, OT

## 2025-01-07 ENCOUNTER — APPOINTMENT (OUTPATIENT)
Dept: PREADMISSION TESTING | Facility: HOSPITAL | Age: 60
End: 2025-01-07
Payer: COMMERCIAL

## 2025-01-07 ASSESSMENT — PAIN SCALES - GENERAL: PAINLEVEL_OUTOF10: 6

## 2025-01-07 ASSESSMENT — PAIN - FUNCTIONAL ASSESSMENT: PAIN_FUNCTIONAL_ASSESSMENT: 0-10

## 2025-01-08 ENCOUNTER — APPOINTMENT (OUTPATIENT)
Dept: OCCUPATIONAL THERAPY | Facility: HOSPITAL | Age: 60
End: 2025-01-08
Payer: COMMERCIAL

## 2025-01-13 ENCOUNTER — APPOINTMENT (OUTPATIENT)
Dept: OCCUPATIONAL THERAPY | Facility: HOSPITAL | Age: 60
End: 2025-01-13
Payer: COMMERCIAL

## 2025-01-15 ENCOUNTER — DOCUMENTATION (OUTPATIENT)
Dept: OCCUPATIONAL THERAPY | Facility: HOSPITAL | Age: 60
End: 2025-01-15
Payer: COMMERCIAL

## 2025-01-15 NOTE — PROGRESS NOTES
Occupational Therapy                 Therapy Communication Note    Patient Name: Ha Hdz  MRN: [id number]  Department:   Room: Room/bed info not found  Today's Date: 1/15/2025     Discipline: Occupational Therapy        Missed Visit Reason:      Missed Time: No Show follow up visit today.

## 2025-01-16 ENCOUNTER — DOCUMENTATION (OUTPATIENT)
Dept: OCCUPATIONAL THERAPY | Facility: HOSPITAL | Age: 60
End: 2025-01-16
Payer: COMMERCIAL

## 2025-01-16 NOTE — PROGRESS NOTES
Occupational Therapy                 Therapy Communication Note    Patient Name: Ha Hdz  MRN: [id number]  Department:   Room: Room/bed info not found  Today's Date: 1/16/2025     Discipline: Occupational Therapy          Missed Visit Reason:      Missed Time: No Show    Comment:

## 2025-01-20 ENCOUNTER — DOCUMENTATION (OUTPATIENT)
Dept: OCCUPATIONAL THERAPY | Facility: HOSPITAL | Age: 60
End: 2025-01-20
Payer: COMMERCIAL

## 2025-01-22 ENCOUNTER — APPOINTMENT (OUTPATIENT)
Dept: ORTHOPEDIC SURGERY | Facility: CLINIC | Age: 60
End: 2025-01-22
Payer: COMMERCIAL

## 2025-01-23 ENCOUNTER — TELEPHONE (OUTPATIENT)
Dept: ORTHOPEDIC SURGERY | Facility: CLINIC | Age: 60
End: 2025-01-23
Payer: COMMERCIAL

## 2025-01-23 DIAGNOSIS — G56.03 CARPAL TUNNEL SYNDROME, BILATERAL: Primary | ICD-10-CM

## 2025-01-23 NOTE — TELEPHONE ENCOUNTER
Patient called asking for an order for bilateral hand braces for CTS. He states he bought some OTC and they are too short and not helping. Please advise. Thanks Lesly

## 2025-01-27 ENCOUNTER — APPOINTMENT (OUTPATIENT)
Dept: OCCUPATIONAL THERAPY | Facility: HOSPITAL | Age: 60
End: 2025-01-27
Payer: COMMERCIAL

## 2025-01-30 ENCOUNTER — OFFICE VISIT (OUTPATIENT)
Dept: ORTHOPEDIC SURGERY | Facility: CLINIC | Age: 60
End: 2025-01-30
Payer: COMMERCIAL

## 2025-01-30 DIAGNOSIS — G56.02 LEFT CARPAL TUNNEL SYNDROME: Primary | ICD-10-CM

## 2025-01-30 PROCEDURE — 99214 OFFICE O/P EST MOD 30 MIN: CPT | Performed by: ORTHOPAEDIC SURGERY

## 2025-01-30 NOTE — PROGRESS NOTES
Chief Complaint   Chief Complaint   Patient presents with    Left Hand - Pain         HPI:      Ha Hdz is a pleasant 59 y.o. year-old male who is seen today for chronic carpal tunnel syndrome left hand and wrist he had right carpal tunnel release on the right side unfortunately has had persistent stiffness and pain which is being addressed with occupational therapy wants to proceed with left carpal tunnel release surgery as he has failed conservative therapy which has included rest and splinting    Review of Systems all other body systems have been reviewed and are negative for complaint.    There were no vitals filed for this visit.    Past Medical History:   Diagnosis Date    Anesthesia of skin     Numbness    Arthritis     Asthma     Joint pain     Other conditions influencing health status 01/25/2017    History of cough    Other specified disorders of nose and nasal sinuses 01/25/2017    Sinus pressure    Other specified disorders of nose and nasal sinuses 03/07/2019    Sinus pressure    Other specified health status     Known health problems: none    Personal history of other diseases of the musculoskeletal system and connective tissue     History of low back pain     Patient Active Problem List   Diagnosis    Carpal tunnel syndrome on right    Trigger ring finger of right hand    Trigger finger, right middle finger    Carpal tunnel syndrome on left       Medication Documentation Review Audit       Reviewed by Bhavya Yuen MA (Medical Assistant) on 12/11/24 at 0826      Medication Order Taking? Sig Documenting Provider Last Dose Status   albuterol (ProAir HFA) 90 mcg/actuation inhaler [id number] No Inhale 2 puffs every 4 hours if needed. Historical Provider, MD Past Month Active   albuterol 90 mcg/actuation inhaler [id number] No Inhale 1-2 puffs every 6 hours if needed. Historical Provider, MD Past Week Active   budesonide-formoteroL (Symbicort) 160-4.5 mcg/actuation inhaler [id number] No Inhale 2  Preferred method of results communication: Phone: Okay to leave a message containing results? YES       Health Maintenance       COVID-19 Vaccine (6 - 2023-24 season)  Overdue since 9/1/2024    Traditional Medicare- Medicare Wellness Visit (Yearly)  Due since 9/1/2024    Influenza Vaccine (1)  Due since 9/1/2024           Following review of the above:  Patient is not proceeding with: COVID-19 and Influenza    Note: Refer to final orders and clinician documentation.           3.) During the past 4 weeks, how would you rate your health?: Fair     5.) Do you do moderate to strenuous exercise (brisk walk) for about 20 minutes for 3 or more days per week?: No, I usually do not exercise this much     6 a.) How many servings of Fruits and Vegetables do you have each day ( 1 serving = 1 piece of fruit, 1/2 cup fruits or vegetables): 1 per day     6 b.) How many servings of High Fiber / Whole Grain Foods to you have each day ( 1 serving = 1 cup cold cereal, 1/2 cup cooked cereal, 1 slice bread): 1 per day     7b.) Do you feel unsteady when standing or walking?: Yes     7c.) Do you worry about falling?: Yes     8.) During the past 4 weeks, has your physical and emotional health limited your social activities with family, friends, neighbors, or other groups?: Extremely     11b.) Bowel control problems: Always     15.) How confident are you that you can control and manage most of your health problems?: Somewhat confident          Review Flowsheet  More data exists         9/15/2024   PHQ 2/9 Score   Adult PHQ 2 Score 0   Adult PHQ 2 Interpretation No further screening needed   Little interest or pleasure in activity? Not at all   Feeling down, depressed or hopeless? Not at all      Details                          "puffs 2 times a day. Henry Thibodeaux MD Past Month Active   cetirizine (ZyrTEC) 10 mg tablet [id number] No Take 1 tablet (10 mg) by mouth once daily. Henry Thibodeaux MD 12/10/2024 Active   EPINEPHrine 0.3 mg/0.3 mL injection syringe [id number] No Inject 0.3 mL (0.3 mg) into the muscle. Henry Thibodeaux MD Past Month Active    mg tablet [id number] No TAKE ONE TABLET BY MOUTH EVERY 6 HOURS AS NEEDED FOR PAIN FOR UP TO 10 DAYS Henry Thibodeaux MD Past Week Active   lisinopril 10 mg tablet [id number] No Take 1 tablet (10 mg) by mouth once daily. Henry Thibodeaux MD 12/10/2024 Active   methylPREDNISolone (Medrol Dospak) 4 mg tablets [id number] No Take 1 tablet (4 mg) by mouth 1 time for 1 dose. Use as directed by package instructions Mervin Martinez MD 12/10/2024  12/10/24 [id number]   sulfamethoxazole-trimethoprim (Bactrim) 400-80 mg tablet [id number] No Take 1 tablet by mouth 2 times a day. Henry Thibodeaux MD 12/10/2024 Active   tiZANidine (Zanaflex) 4 mg tablet [id number] No Take 1 tablet (4 mg) by mouth every 6 hours if needed.   Patient not taking: Reported on 12/10/2024    Henry Thibodeaux MD More than a month Active                    Allergies   Allergen Reactions    Adhesive Other     bandaids burn skin    Penicillins Hives     Stated \"as child \"    Latex, Natural Rubber Rash       Social History     Socioeconomic History    Marital status:      Spouse name: Not on file    Number of children: Not on file    Years of education: Not on file    Highest education level: Not on file   Occupational History    Not on file   Tobacco Use    Smoking status: Former     Current packs/day: 0.00     Types: Cigarettes     Quit date:      Years since quittin.1    Smokeless tobacco: Never   Vaping Use    Vaping status: Never Used   Substance and Sexual Activity    Alcohol use: Yes     Comment: socially    Drug use: Never    Sexual activity: Defer   Other Topics Concern    Not on " file   Social History Narrative    Not on file     Social Drivers of Health     Financial Resource Strain: Low Risk  (4/30/2020)    Received from [facility]    Overall Financial Resource Strain (CARDIA)     Difficulty of Paying Living Expenses: Not hard at all   Food Insecurity: Unknown (5/1/2024)    Received from [facility]    Hunger Vital Sign     Worried About Running Out of Food in the Last Year: Never true     Ran Out of Food in the Last Year: Not on file   Transportation Needs: No Transportation Needs (1/14/2020)    Received from [facility]    PRAPARE - Transportation     Lack of Transportation (Medical): No     Lack of Transportation (Non-Medical): No   Physical Activity: Sufficiently Active (4/30/2020)    Received from [facility]    Exercise Vital Sign     Days of Exercise per Week: 5 days     Minutes of Exercise per Session: 60 min   Stress: No Stress Concern Present (4/30/2020)    Received from [facility]    [employer] - Occupational Stress Questionnaire     Feeling of Stress : Not at all   Social Connections: Moderately Integrated (4/30/2020)    Received from [facility]    Social Connection and Isolation Panel [NHANES]     Frequency of Communication with Friends and Family: More than three times a week     Frequency of Social Gatherings with Friends and Family: Once a week     Attends Restorationist Services: More than 4 times per year     Active Member of Clubs or Organizations: No     Attends Club or Organization Meetings: Never     Marital Status:    Intimate Partner Violence: Not on file   Housing Stability: Not on file       Past Surgical History:   Procedure Laterality Date    CARPAL TUNNEL RELEASE      LUMBAR LAMINECTOMY      OTHER SURGICAL HISTORY  03/12/2019    Rotator cuff repair       There is no height or weight on file to calculate  "BMI.    HgA1c:   No results found for: \"HGBA1C\", \"[id number]\"    Physical Exam:  Constitutional: Well-developed well-nourished   Eyes: Sclerae anicteric, pupils equal and round  HENT: Normocephalic atraumatic  Cardiovascular: Pulses full, regular rate and rhythm  Respiratory: Breathing not labored, no wheezing  Integumentary: Skin intact, no lesions or rashes  Neurological: , no gross strength deficits, reflexes equal  Psychiatric: Alert oriented and appropriate  Hematologic/lymphatic: No lymphadenopathy  Left hand and wrist: Thenar flattening diminished sensibility median nerve distribution intrinsics intact positive Phalen's and Tinel's            Imaging:  No new imaging        Impression/Plan:  Chronic left carpal tunnel syndrome schedule surgery has failed conservative treatment has included passage of time nighttime splinting   had right carpal tunnel release without good result  Risks, benefits, goals of surgery as well as alternative treatments were discussed.   Realistic post operative expectations and expected course of recovery were reviewed.  All questions answered.  "

## 2025-02-03 ENCOUNTER — TELEPHONE (OUTPATIENT)
Dept: ORTHOPEDIC SURGERY | Facility: CLINIC | Age: 60
End: 2025-02-03
Payer: COMMERCIAL

## 2025-02-03 DIAGNOSIS — G56.02 CARPAL TUNNEL SYNDROME ON LEFT: Primary | ICD-10-CM

## 2025-02-06 ENCOUNTER — CLINICAL SUPPORT (OUTPATIENT)
Dept: PREADMISSION TESTING | Facility: HOSPITAL | Age: 60
End: 2025-02-06
Payer: COMMERCIAL

## 2025-02-06 ENCOUNTER — DOCUMENTATION (OUTPATIENT)
Dept: OCCUPATIONAL THERAPY | Facility: HOSPITAL | Age: 60
End: 2025-02-06
Payer: COMMERCIAL

## 2025-02-06 DIAGNOSIS — G56.02 CARPAL TUNNEL SYNDROME ON LEFT: ICD-10-CM

## 2025-02-06 NOTE — CPM/PAT NURSE NOTE
"CPM/PAT Nurse Note      Name: Ha JUSTIN Andreina (Ha Hdz)  /Age: 1965/59 y.o.       Past Medical History:   Diagnosis Date    Arthritis     Asthma     Carpal tunnel syndrome on right     s/p RIGHT CARPAL TUNNEL RELEASE 24    Hypertension     Left carpal tunnel syndrome     Plan: Left Wrist Nerve Decompression 25    Lumbar disc herniation     s/p LAMNOTMY INCL W/DCMPRSN NRV ROOT 1 INTRSPC LUMBR  DISCOTOMY LUMBAR LEVEL 1 3/18/24    Obesity     Trigger finger, right middle finger     s/p release 24    Trigger ring finger of right hand     s/p release 24       Past Surgical History:   Procedure Laterality Date    CARPAL TUNNEL RELEASE Right 2024    LUMBAR LAMINECTOMY  2024    LAMNOTMY INCL W/DCMPRSN NRV ROOT 1 INTRSPC LUMBR  DISCOTOMY LUMBAR LEVEL 1    ROTATOR CUFF REPAIR Left     TRIGGER FINGER RELEASE  2024    RIGHT MIDDLE FINGER A-1 PULLEY RELEASE & RIGHT RING FINGER A-1 PULLEY RELEASE       Patient Sexual activity questions deferred to the physician.    Family History   Problem Relation Name Age of Onset    Hypertension Mother      Diabetes Mother      Prostate cancer Father      No Known Problems Sister      Heart disease Maternal Grandfather         Allergies   Allergen Reactions    Adhesive Other     bandaids burn skin    Latex, Natural Rubber Rash    Penicillins Hives     Stated \"as child \"       Prior to Admission medications    Medication Sig Start Date End Date Taking? Authorizing Provider   albuterol 90 mcg/actuation inhaler Inhale 1-2 puffs every 6 hours if needed.  Patient not taking: Reported on 2025   Historical Provider, MD   budesonide-formoteroL (Symbicort) 160-4.5 mcg/actuation inhaler Inhale 2 puffs 2 times a day.  Patient not taking: Reported on 2025 1/3/19   Historical Provider, MD   cetirizine (ZyrTEC) 10 mg tablet Take 1 tablet (10 mg) by mouth if needed for rhinitis or allergies.    Historical Provider, MD   EPINEPHrine 0.3 " mg/0.3 mL injection syringe Inject 0.3 mL (0.3 mg) into the muscle. 6/28/24   Historical Provider, MD    mg tablet TAKE ONE TABLET BY MOUTH EVERY 6 HOURS AS NEEDED FOR PAIN FOR UP TO 10 DAYS    Historical Provider, MD   lisinopril 10 mg tablet Take 1 tablet (10 mg) by mouth once daily.    Historical Provider, MD   albuterol (ProAir HFA) 90 mcg/actuation inhaler Inhale 2 puffs every 4 hours if needed. 5/27/16 2/6/25  Historical Provider, MD   sulfamethoxazole-trimethoprim (Bactrim) 400-80 mg tablet Take 1 tablet by mouth 2 times a day.  Patient not taking: Reported on 2/6/2025 2/6/25  Historical Provider, MD   tiZANidine (Zanaflex) 4 mg tablet Take 1 tablet (4 mg) by mouth every 6 hours if needed.  Patient not taking: Reported on 12/10/2024 4/1/24 2/6/25  Historical Provider, MD ALIREZA ZARCO     DASI Risk Score      Flowsheet Row Pre-Admission Testing from 12/10/2024 in [facility] Pre-Admission Testing from 10/25/2024 in [facility]   Can you take care of yourself (eat, dress, bathe, or use toilet)?  2.75 filed at 12/10/2024 1045 2.75 filed at 10/25/2024 0928   Can you walk indoors, such as around your house? 1.75 filed at 12/10/2024 1045 1.75 filed at 10/25/2024 0928   Can you walk a block or two on level ground?  2.75 filed at 12/10/2024 1045 2.75 filed at 10/25/2024 0928   Can you climb a flight of stairs or walk up a hill? 5.5 filed at 12/10/2024 1045 5.5 filed at 10/25/2024 0928   Can you run a short distance? 8 filed at 12/10/2024 1045 8 filed at 10/25/2024 0928   Can you do light work around the house like dusting or washing dishes? 2.7 filed at 12/10/2024 1045 2.7 filed at 10/25/2024 0928   Can you do moderate work around the house like vacuuming, sweeping floors or carrying groceries? 3.5 filed at 12/10/2024 1045 3.5 filed at 10/25/2024 0928   Can you do heavy work around the house like scrubbing floors or lifting and moving heavy furniture?  0 filed at 12/10/2024 1045 0  filed at 10/25/2024 0928   Can you do yard work like raking leaves, weeding or pushing a mower? 4.5 filed at 12/10/2024 1045 0 filed at 10/25/2024 0928   Can you have sexual relations? 5.25 filed at 12/10/2024 1045 5.25 filed at 10/25/2024 0928   Can you participate in moderate recreational activities like golf, bowling, dancing, doubles tennis or throwing a baseball or football? 0 filed at 12/10/2024 1045 0 filed at 10/25/2024 0928   Can you participate in strenous sports like swimming, singles tennis, football, basketball, or skiing? 0 filed at 12/10/2024 1045 0 filed at 10/25/2024 0928   DASI SCORE 36.7 filed at 12/10/2024 1045 32.2 filed at 10/25/2024 0928   METS Score (Will be calculated only when all the questions are answered) 7.3 filed at 12/10/2024 1045 6.7 filed at 10/25/2024 0928          Caprini DVT Assessment    No data to display       Modified Frailty Index    No data to display       CHADS2 Stroke Risk  Current as of 3 days ago        N/A 3 to 100%: High Risk   2 to < 3%: Medium Risk   0 to < 2%: Low Risk     Last Change: N/A          This score determines the patient's risk of having a stroke if the patient has atrial fibrillation.        This score is not applicable to this patient. Components are not calculated.          Revised Cardiac Risk Index    No data to display       Apfel Simplified Score    No data to display       Risk Analysis Index Results This Encounter    No data found in the last 10 encounters.       Stop Bang Score      Flowsheet Row Pre-Admission Testing from 12/10/2024 in [facility] Pre-Admission Testing from 10/25/2024 in [facility]   Do you snore loudly? 0 filed at 12/10/2024 1045 1 filed at 10/25/2024 0928   Do you often feel tired or fatigued after your sleep? 0 filed at 12/10/2024 1045 0 filed at 10/25/2024 0928   Has anyone ever observed you stop breathing in your sleep? 0 filed at 12/10/2024 1045 0 filed at 10/25/2024 0928   Do you have or are  you being treated for high blood pressure? 1 filed at 12/10/2024 1045 1 filed at 10/25/2024 0928   Recent BMI (Calculated) 31.6 filed at 12/10/2024 1045 31.9 filed at 10/25/2024 0928   Is BMI greater than 35 kg/m2? 0=No filed at 12/10/2024 1045 0=No filed at 10/25/2024 0928   Age older than 50 years old? 1=Yes filed at 12/10/2024 1045 1=Yes filed at 10/25/2024 0928   Is your neck circumference greater than 17 inches (Male) or 16 inches (Female)? 0 filed at 12/10/2024 1045 1 filed at 10/25/2024 0928   Gender - Male 1=Yes filed at 12/10/2024 1045 1=Yes filed at 10/25/2024 0928   STOP-BANG Total Score 3 filed at 12/10/2024 1045 5 filed at 10/25/2024 0928          Prodigy: High Risk  Total Score: 8              Prodigy Gender Score          ARISCAT Score for Postoperative Pulmonary Complications    No data to display       Bui Perioperative Risk for Myocardial Infarction or Cardiac Arrest (BRI)    No data to display         Nurse Plan of Action: RN screening call complete.  Reviewed allergies, medications and pharmacy, medical, surgical and social history with patient.  Chart updated.

## 2025-02-06 NOTE — PROGRESS NOTES
Occupational Therapy Discharge      Patient Name: Ha Hdz  MRN: [id number]  Today's Date: 2/6/2025      Time:       Insurance:  Visit number: 1 of 1 including evaluation   Authorization info: 30 visits/year   Ins[facility]: [facility] / Product Type: *No Product type* /    Referred by: Elieser  Referred for: R CTR and LF & RF trigger finger releases.      Discharge Information: Date of discharge 2/6/25 and Date of last visit 1/6/25    Therapy Summary:  was only seen for an OT evaluation. He no showed for his next two scheduled appointments.     Discharge Status: Mr. Hdz has only attended 1/3 scheduled OT sessions. He no-showed for his last two scheduled appointments. Recommend discharge due to non-compliance with plan of care.      Rehab Discharge Reason: Failed to schedule and/or keep follow-up appointment(s)

## 2025-02-07 ENCOUNTER — DOCUMENTATION (OUTPATIENT)
Dept: PREADMISSION TESTING | Facility: HOSPITAL | Age: 60
End: 2025-02-07

## 2025-02-07 ENCOUNTER — PRE-ADMISSION TESTING (OUTPATIENT)
Dept: PREADMISSION TESTING | Facility: HOSPITAL | Age: 60
End: 2025-02-07
Payer: COMMERCIAL

## 2025-02-07 VITALS
HEIGHT: 70 IN | HEART RATE: 58 BPM | SYSTOLIC BLOOD PRESSURE: 142 MMHG | RESPIRATION RATE: 18 BRPM | DIASTOLIC BLOOD PRESSURE: 88 MMHG | BODY MASS INDEX: 32.19 KG/M2 | TEMPERATURE: 97.4 F | WEIGHT: 224.87 LBS | OXYGEN SATURATION: 98 %

## 2025-02-07 PROCEDURE — 99244 OFF/OP CNSLTJ NEW/EST MOD 40: CPT | Performed by: PHYSICIAN ASSISTANT

## 2025-02-07 RX ORDER — LANSOPRAZOLE 15 MG/1
15 TABLET, ORALLY DISINTEGRATING, DELAYED RELEASE ORAL DAILY
COMMUNITY

## 2025-02-07 ASSESSMENT — ENCOUNTER SYMPTOMS
RESPIRATORY NEGATIVE: 1
NEUROLOGICAL NEGATIVE: 1
HEMATOLOGIC/LYMPHATIC NEGATIVE: 1
CONSTITUTIONAL NEGATIVE: 1
ALLERGIC/IMMUNOLOGIC NEGATIVE: 1
MUSCULOSKELETAL NEGATIVE: 1
GASTROINTESTINAL NEGATIVE: 1
EYES NEGATIVE: 1
CARDIOVASCULAR NEGATIVE: 1
ENDOCRINE NEGATIVE: 1
PSYCHIATRIC NEGATIVE: 1

## 2025-02-07 NOTE — CPM/PAT NURSE NOTE
"CPM/PAT Nurse Note      Name: Ha Hdz (Ha Hdz)  /Age: 1965/59 y.o.       Past Medical History:   Diagnosis Date    Arthritis     Asthma     Carpal tunnel syndrome on right     s/p RIGHT CARPAL TUNNEL RELEASE 24    Hypertension     Left carpal tunnel syndrome     Plan: Left Wrist Nerve Decompression 25    Lumbar disc herniation     s/p LAMNOTMY INCL W/DCMPRSN NRV ROOT 1 INTRSPC LUMBR  DISCOTOMY LUMBAR LEVEL 1 3/18/24    Obesity     Trigger finger, right middle finger     s/p release 24    Trigger ring finger of right hand     s/p release 24       Past Surgical History:   Procedure Laterality Date    CARPAL TUNNEL RELEASE Right 2024    LUMBAR LAMINECTOMY  2024    LAMNOTMY INCL W/DCMPRSN NRV ROOT 1 INTRSPC LUMBR  DISCOTOMY LUMBAR LEVEL 1    ROTATOR CUFF REPAIR Left     TRIGGER FINGER RELEASE  2024    RIGHT MIDDLE FINGER A-1 PULLEY RELEASE & RIGHT RING FINGER A-1 PULLEY RELEASE       Patient Sexual activity questions deferred to the physician.    Family History   Problem Relation Name Age of Onset    Hypertension Mother      Diabetes Mother      Prostate cancer Father      No Known Problems Sister      Heart disease Maternal Grandfather         Allergies   Allergen Reactions    Adhesive Other     bandaids burn skin    Latex, Natural Rubber Rash    Penicillins Hives     Stated \"as child \"       Prior to Admission medications    Medication Sig Start Date End Date Taking? Authorizing Provider   albuterol 90 mcg/actuation inhaler Inhale 1-2 puffs every 6 hours if needed.  Patient not taking: Reported on 2025   Historical Provider, MD   budesonide-formoteroL (Symbicort) 160-4.5 mcg/actuation inhaler Inhale 2 puffs 2 times a day.  Patient not taking: Reported on 2025 1/3/19   Historical Provider, MD   cetirizine (ZyrTEC) 10 mg tablet Take 1 tablet (10 mg) by mouth if needed for rhinitis or allergies.  Patient not taking: Reported on 2025    " Historical Provider, MD   EPINEPHrine 0.3 mg/0.3 mL injection syringe Inject 0.3 mL (0.3 mg) into the muscle.  Patient not taking: Reported on 2/7/2025 6/28/24   Historical Provider, MD    mg tablet TAKE ONE TABLET BY MOUTH EVERY 6 HOURS AS NEEDED FOR PAIN FOR UP TO 10 DAYS  Patient not taking: Reported on 2/7/2025    Historical Provider, MD   lisinopril 10 mg tablet Take 1 tablet (10 mg) by mouth once daily.    Historical Provider, MD   albuterol (ProAir HFA) 90 mcg/actuation inhaler Inhale 2 puffs every 4 hours if needed. 5/27/16 2/6/25  Historical Provider, MD   sulfamethoxazole-trimethoprim (Bactrim) 400-80 mg tablet Take 1 tablet by mouth 2 times a day.  Patient not taking: Reported on 2/6/2025 2/6/25  Historical Provider, MD   tiZANidine (Zanaflex) 4 mg tablet Take 1 tablet (4 mg) by mouth every 6 hours if needed.  Patient not taking: Reported on 12/10/2024 4/1/24 2/6/25  Historical Provider, MD ALIREZA ZARCO     DASI Risk Score      Flowsheet Row Pre-Admission Testing from 12/10/2024 in [facility] Pre-Admission Testing from 10/25/2024 in [facility]   Can you take care of yourself (eat, dress, bathe, or use toilet)?  2.75 filed at 12/10/2024 1045 2.75 filed at 10/25/2024 0928   Can you walk indoors, such as around your house? 1.75 filed at 12/10/2024 1045 1.75 filed at 10/25/2024 0928   Can you walk a block or two on level ground?  2.75 filed at 12/10/2024 1045 2.75 filed at 10/25/2024 0928   Can you climb a flight of stairs or walk up a hill? 5.5 filed at 12/10/2024 1045 5.5 filed at 10/25/2024 0928   Can you run a short distance? 8 filed at 12/10/2024 1045 8 filed at 10/25/2024 0928   Can you do light work around the house like dusting or washing dishes? 2.7 filed at 12/10/2024 1045 2.7 filed at 10/25/2024 0928   Can you do moderate work around the house like vacuuming, sweeping floors or carrying groceries? 3.5 filed at 12/10/2024 1045 3.5 filed at 10/25/2024 0928   Can  you do heavy work around the house like scrubbing floors or lifting and moving heavy furniture?  0 filed at 12/10/2024 1045 0 filed at 10/25/2024 0928   Can you do yard work like raking leaves, weeding or pushing a mower? 4.5 filed at 12/10/2024 1045 0 filed at 10/25/2024 0928   Can you have sexual relations? 5.25 filed at 12/10/2024 1045 5.25 filed at 10/25/2024 0928   Can you participate in moderate recreational activities like golf, bowling, dancing, doubles tennis or throwing a baseball or football? 0 filed at 12/10/2024 1045 0 filed at 10/25/2024 0928   Can you participate in strenous sports like swimming, singles tennis, football, basketball, or skiing? 0 filed at 12/10/2024 1045 0 filed at 10/25/2024 0928   DASI SCORE 36.7 filed at 12/10/2024 1045 32.2 filed at 10/25/2024 0928   METS Score (Will be calculated only when all the questions are answered) 7.3 filed at 12/10/2024 1045 6.7 filed at 10/25/2024 0928          Caprini DVT Assessment    No data to display       Modified Frailty Index    No data to display       CHADS2 Stroke Risk  Current as of yesterday        N/A 3 to 100%: High Risk   2 to < 3%: Medium Risk   0 to < 2%: Low Risk     Last Change: N/A          This score determines the patient's risk of having a stroke if the patient has atrial fibrillation.        This score is not applicable to this patient. Components are not calculated.          Revised Cardiac Risk Index    No data to display       Apfel Simplified Score    No data to display       Risk Analysis Index Results This Encounter    No data found in the last 10 encounters.       Stop Bang Score      Flowsheet Row Pre-Admission Testing from 12/10/2024 in [facility] Pre-Admission Testing from 10/25/2024 in [facility]   Do you snore loudly? 0 filed at 12/10/2024 1045 1 filed at 10/25/2024 0928   Do you often feel tired or fatigued after your sleep? 0 filed at 12/10/2024 1045 0 filed at 10/25/2024 0928   Has anyone  ever observed you stop breathing in your sleep? 0 filed at 12/10/2024 1045 0 filed at 10/25/2024 0928   Do you have or are you being treated for high blood pressure? 1 filed at 12/10/2024 1045 1 filed at 10/25/2024 0928   Recent BMI (Calculated) 31.6 filed at 12/10/2024 1045 31.9 filed at 10/25/2024 0928   Is BMI greater than 35 kg/m2? 0=No filed at 12/10/2024 1045 0=No filed at 10/25/2024 0928   Age older than 50 years old? 1=Yes filed at 12/10/2024 1045 1=Yes filed at 10/25/2024 0928   Is your neck circumference greater than 17 inches (Male) or 16 inches (Female)? 0 filed at 12/10/2024 1045 1 filed at 10/25/2024 0928   Gender - Male 1=Yes filed at 12/10/2024 1045 1=Yes filed at 10/25/2024 0928   STOP-BANG Total Score 3 filed at 12/10/2024 1045 5 filed at 10/25/2024 0928          Prodigy: High Risk  Total Score: 8              Prodigy Gender Score          ARISCAT Score for Postoperative Pulmonary Complications    No data to display       Bui Perioperative Risk for Myocardial Infarction or Cardiac Arrest (BRI)    No data to display         Nurse Plan of Action:     After Visit Summary (AVS) reviewed and patient verbalized good understanding of medications and NPO instructions.

## 2025-02-07 NOTE — H&P (VIEW-ONLY)
[facility] Evaluation       Name: Ha Hdz (Ha Hdz)  /Age: 1965/59 y.o.     In-Person       Chief Complaint: Left carpal tunnel syndrome [G56.02]     HPI    Date of Consult: 25    Referring Provider: Dr. Boland     Surgery, Date, and Length: Left Wrist Nerve Decompression ; 25; 60 minutes     Ha Hdz  is a 59 year-old male who presents to the [facility] for perioperative risk assessment prior to surgery. Right hand dominant. Presented  for chronic carpal tunnel syndrome left hand and wrist. Keeping him up all night. With burning pain.  Also numbness first 3 fingers. States he feels pop as he tries to lift something. He has failed conservative therapy which has included rest and splinting.  He is s/p prior right CTR.      This note was created in part upon personal review of patient's medical records.      Patient is scheduled to have Left Wrist Nerve Decompression     Medical History  Past Medical History:   Diagnosis Date    Arthritis     Asthma     stable    Carpal tunnel syndrome on right     s/p RIGHT CARPAL TUNNEL RELEASE 24    GERD (gastroesophageal reflux disease)     under control    Hypertension     Left carpal tunnel syndrome     Plan: Left Wrist Nerve Decompression 25            Surgical History  Past Surgical History:   Procedure Laterality Date    CARPAL TUNNEL RELEASE Right 2024    LUMBAR LAMINECTOMY  2024    LAMNOTMY INCL W/DCMPRSN NRV ROOT 1 INTRSPC LUMBR  DISCOTOMY LUMBAR LEVEL 1    ROTATOR CUFF REPAIR Left     TRIGGER FINGER RELEASE  2024    RIGHT MIDDLE FINGER A-1 PULLEY RELEASE & RIGHT RING FINGER A-1 PULLEY RELEASE             Family history:  Family History   Problem Relation Name Age of Onset    Hypertension Mother      Diabetes Mother      Prostate cancer Father      No Known Problems Sister      Heart disease Maternal Grandfather          Social history:  Social History     Socioeconomic History    Marital status:       Spouse name: Not on file    Number of children: Not on file    Years of education: Not on file    Highest education level: Not on file   Occupational History    Not on file   Tobacco Use    Smoking status: Former     Current packs/day: 0.00     Average packs/day: 0.5 packs/day for 14.0 years (7.0 ttl pk-yrs)     Types: Cigarettes     Start date:      Quit date:      Years since quittin.1    Smokeless tobacco: Never   Vaping Use    Vaping status: Never Used   Substance and Sexual Activity    Alcohol use: Yes     Comment: rare social    Drug use: Never    Sexual activity: Defer   Other Topics Concern    Not on file   Social History Narrative    Not on file     Social Drivers of Health     Financial Resource Strain: Low Risk  (2020)    Received from [facility]    Overall Financial Resource Strain (CARDIA)     Difficulty of Paying Living Expenses: Not hard at all   Food Insecurity: Unknown (2024)    Received from [facility]    Hunger Vital Sign     Worried About Running Out of Food in the Last Year: Never true     Ran Out of Food in the Last Year: Not on file   Transportation Needs: No Transportation Needs (2020)    Received from [facility]    PRAPARE - Transportation     Lack of Transportation (Medical): No     Lack of Transportation (Non-Medical): No   Physical Activity: Sufficiently Active (2020)    Received from [facility]    Exercise Vital Sign     Days of Exercise per Week: 5 days     Minutes of Exercise per Session: 60 min   Stress: No Stress Concern Present (2020)    Received from [facility]    [employer] - Occupational Stress Questionnaire     Feeling of Stress : Not at all   Social Connections: Moderately Integrated (2020)    Received from [facility]    Social Connection and Isolation Panel [NHANES]      "Frequency of Communication with Friends and Family: More than three times a week     Frequency of Social Gatherings with Friends and Family: Once a week     Attends Hoahaoism Services: More than 4 times per year     Active Member of Clubs or Organizations: No     Attends Club or Organization Meetings: Never     Marital Status:    Intimate Partner Violence: Not on file   Housing Stability: Not on file        Current Outpatient Medications   Medication Instructions    albuterol 90 mcg/actuation inhaler 1-2 puffs, Every 6 hours PRN    budesonide-formoteroL (Symbicort) 160-4.5 mcg/actuation inhaler 2 puffs, 2 times daily    cetirizine (ZYRTEC) 10 mg, As needed    EPINEPHrine (EPIPEN) 0.3 mg     mg tablet TAKE ONE TABLET BY MOUTH EVERY 6 HOURS AS NEEDED FOR PAIN FOR UP TO 10 DAYS    lansoprazole (PREVACID SOLUTAB) 15 mg, oral, Daily, Dissolve on tongue before swallowing particles; do not chew, cut, break, or swallow whole.    lisinopril 10 mg, Daily          Visit Vitals  /88   Pulse 58   Temp 36.3 °C (97.4 °F)   Resp 18   Ht 1.77 m (5' 9.69\")   Wt 102 kg (224 lb 13.9 oz)   SpO2 98%   BMI 32.56 kg/m²   Smoking Status Former   BSA 2.24 m²         Review of Systems   Constitutional: Negative.    HENT: Negative.     Eyes: Negative.    Respiratory: Negative.     Cardiovascular: Negative.         METS 4   own lawn care / regular exercise Without chest pain / SOB    Gastrointestinal: Negative.    Endocrine: Negative.    Genitourinary: Negative.    Musculoskeletal: Negative.         Pain / numbness both hands/wrists   Skin: Negative.    Allergic/Immunologic: Negative.    Neurological: Negative.    Hematological: Negative.    Psychiatric/Behavioral: Negative.          Physical Exam  Constitutional:       Appearance: Normal appearance.   HENT:      Head: Normocephalic and atraumatic.      Right Ear: External ear normal.      Left Ear: External ear normal.      Nose: Nose normal.      Mouth/Throat:      " Pharynx: Oropharynx is clear.   Eyes:      Conjunctiva/sclera: Conjunctivae normal.   Cardiovascular:      Rate and Rhythm: Normal rate and regular rhythm.      Heart sounds: Normal heart sounds.   Pulmonary:      Effort: Pulmonary effort is normal.      Breath sounds: Normal breath sounds.   Abdominal:      General: Abdomen is flat.      Palpations: Abdomen is soft.   Musculoskeletal:         General: Normal range of motion.      Cervical back: Normal range of motion and neck supple.   Skin:     General: Skin is warm and dry.   Neurological:      General: No focal deficit present.      Mental Status: He is alert and oriented to person, place, and time.   Psychiatric:         Mood and Affect: Mood normal.         Behavior: Behavior normal.         Thought Content: Thought content normal.         Judgment: Judgment normal.          PAT AIRWAY:   Airway:     Mallampati::  II    Neck ROM::  Full       CBC 12/7/25  WBC  3.70 - 11.00 k/uL 5.11   RBC  4.20 - 6.00 m/uL 4.45   Hemoglobin  13.0 - 17.0 g/dL 13.2   Hematocrit  39.0 - 51.0 % 39.3   MCV  80.0 - 100.0 fL 88.3   MCH  26.0 - 34.0 pg 29.7   MCHC  30.5 - 36.0 g/dL 33.6   RDW-CV  11.5 - 15.0 % 11.9   Platelet Count  150 - 400 k/uL 236   MPV  9.0 - 12.7 fL 9.4   Absolute nRBC  <0.01 k/uL <0.01     [facility] 12/7/25  Glucose  74 - 99 mg/dL 115 High      BUN  9 - 24 mg/dL 14   Creatinine  0.73 - 1.22 mg/dL 0.80   Sodium  136 - 144 mmol/L 140   Potassium  3.7 - 5.1 mmol/L 3.7   Chloride  98 - 107 mmol/L 105   CO2  22 - 30 mmol/L 25   Anion Gap  8 - 15 mmol/L 10   Calcium, Total  8.5 - 10.2 mg/dL 9.2   Estimated Glomerular Filtration Rate  >=60 mL/min/1.73m² 103     Encounter Date: 10/25/24   ECG 12 lead   Result Value    Ventricular Rate 54    Atrial Rate 54    IN Interval 190    QRS Duration 88    QT Interval 458    QTC Calculation(Bazett) 434    P Axis 63    R Axis -24    T Axis -8    QRS Count 8    Q Onset 221    P Onset 126    P Offset 153    T Offset 450    QTC Fredericia  441    Narrative    Sinus bradycardia with sinus arrhythmia  Voltage criteria for left ventricular hypertrophy  Nonspecific T wave abnormality  Abnormal ECG  When compared with ECG of 05-NOV-2022 22:16,  Previous ECG has undetermined rhythm, needs review  Confirmed by Jovanni Spence ([id number]) on 10/30/2024 12:23:05 PM        Patient is scheduled to have Left Wrist Nerve Decompression     Cardiovascular  METS: 4   RCRI: 0  , 3.9 % Risk of MACE  Caprini: 4      HTN- lisinopril HOLD 24 hours prior to surgery     Pulmonary  Stop Bang score is 3 placing patient at moderate risk for SHAMEKA  ARISCAT: <26 points, 1.6% risk of in-hospital postoperative pulmonary complication  PRODIGY: Moderate risk for opioid induced respiratory depression    Asthma - inhalers Continue DOS     Hematology       Patient instructed to ambulate as soon as possible postoperatively to decrease thromboembolic risk.      Initiate mechanical DVT prophylaxis as soon as possible and initiate chemical prophylaxis when deemed safe from a bleeding standpoint post surgery.       VTE prophylaxis per surgical team     GI  GERD- lansoprazole Continue DOS     Tests ordered in PAT: (cbc, bmp done 12/5  EKG done 10/25/24)  LABS REVIEWED: unremarkable     Risk assessment complete.  Patient is scheduled for a low surgical risk procedure.        Preoperative medication instructions were provided and reviewed with the patient.  Any additional testing or evaluation was explained to the patient.  Nothing by mouth instructions were discussed and patient's questions were answered prior to conclusion to this encounter.  Patient verbalized understanding of preoperative instructions given in preadmission testing; discharge instructions available in EMR.

## 2025-02-07 NOTE — PREPROCEDURE INSTRUCTIONS
Medication List            Accurate as of February 7, 2025  7:38 AM. Always use your most recent med list.                albuterol 90 mcg/actuation inhaler  Medication Adjustments for Surgery: Take/Use as prescribed     cetirizine 10 mg tablet  Commonly known as: ZyrTEC  Medication Adjustments for Surgery: Take last dose 1 day (24 hours) before surgery     EPINEPHrine 0.3 mg/0.3 mL injection syringe  Commonly known as: Epipen  Medication Adjustments for Surgery: Take/Use as prescribed      mg tablet  Generic drug: ibuprofen  Notes to patient: HOLD 7 Days prior to surgery - LD 12/4 Stop NOW if not already stopped      lansoprazole 15 mg disintegrating tablet  Commonly known as: Prevacid SoluTab  Medication Adjustments for Surgery: Take on the morning of surgery     lisinopril 10 mg tablet  Medication Adjustments for Surgery: Take last dose 1 day (24 hours) before surgery     Symbicort 160-4.5 mcg/actuation inhaler  Generic drug: budesonide-formoteroL  Medication Adjustments for Surgery: Take/Use as prescribed                 Concerning above medication instructions - If medication is normally taken at night continue normal schedule - do not take night prior and morning of surgery.       Preoperative Deep Breathing Exercises  Why it is important to do deep breathing exercises before my surgery?  Deep breathing exercises strengthen your breathing muscles.  This helps you to recover after your surgery and decreases the chance of breathing complications.  How are the deep breathing exercises done?  Sit straight with your back supported.  Breathe in deeply and slowly through your nose. Your lower rib cage should expand and your abdomen may move forward.  Hold that breath for 3 to 5 seconds.  Breathe out through pursed lips, slowly and completely.  Rest and repeat 10 times every hour while awake.  Rest longer if you become dizzy or lightheaded.      CONTACT SURGEON'S OFFICE IF YOU DEVELOP:  * Fever = 100.4 F   *  New respiratory symptoms (e.g. cough, shortness of breath, respiratory distress, sore throat)  * Recent loss of taste or smell  *Flu like symptoms such as headache, fatigue or gastrointestinal symptoms  * You develop any open sores, shingles, burning or painful urination   AND/OR:  * You no longer wish to have the surgery.  * Any other personal circumstances change that may lead to the need to cancel or defer this surgery.  *You were admitted to any hospital within one week of your planned procedure.    SMOKING:  *Quitting smoking can make a huge difference to your health and recovery from surgery.    *If you need help with quitting, call 6-166-QUIT-NOW.    THE DAY OF SURGERY:  *Do not eat any food after midnight the night before surgery/procedure.   *YOU MUST DRINK 14 oz drink clear liquids (i.e. water, black coffee/tea, (no milk or cream) apple juice, and electrolyte drinks (Gatorade)  2 hours before your instructed arrival time to the hospital.  *You may chew gum until  2 hours before your surgery/procedure.    SURGICAL TIME  *You will be contacted between 2 p.m. and 6 p.m. the business day before your surgery with your arrival time.  *If you haven't received a call by 6pm, call [phone].  *Scheduled surgery times may change and you will be notified if this occurs-check your personal voicemail for any updates.    ON THE MORNING OF SURGERY:  *Wear comfortable, loose fitting clothing.   *Do not use moisturizers, creams, lotions or perfume.  *All jewelry and valuables should be left at home.  *Prosthetic devices such as contact lenses, hearing aids, dentures, eyelash extensions, hairpins and body piercing must be removed before surgery.    BRING WITH YOU:  *Photo ID and insurance card  *Current list of medicines and allergies  *Pacemaker/Defibrillator/Heart stent cards  *CPAP machine and mask  *Slings/splints/crutches  *Copy of your complete Advanced Directive/DHPOA-if applicable  *Neurostimulator implant  remote    PARKING AND ARRIVAL:  *Check in at the Main Entrance desk and let them know you are here for surgery.  *You will be directed to the 2nd floor surgical waiting area.    AFTER OUTPATIENT SURGERY:  *A responsible adult MUST accompany you at the time of discharge and stay with you for 24 hours after your surgery.  *You may NOT drive yourself home after surgery.  *You may use a taxi or ride sharing service ([employer], Uber) to return home ONLY if you are accompanied by a friend or family member.  *Instructions for resuming your medications will be provided by your surgeon.

## 2025-02-07 NOTE — CPM/PAT H&P
[facility] Evaluation       Name: Ha Hdz (Ha Hdz)  /Age: 1965/59 y.o.     In-Person       Chief Complaint: Left carpal tunnel syndrome [G56.02]     HPI    Date of Consult: 25    Referring Provider: Dr. Boland     Surgery, Date, and Length: Left Wrist Nerve Decompression ; 25; 60 minutes     Ha Hdz  is a 59 year-old male who presents to the [facility] for perioperative risk assessment prior to surgery. Right hand dominant. Presented  for chronic carpal tunnel syndrome left hand and wrist. Keeping him up all night. With burning pain.  Also numbness first 3 fingers. States he feels pop as he tries to lift something. He has failed conservative therapy which has included rest and splinting.  He is s/p prior right CTR.      This note was created in part upon personal review of patient's medical records.      Patient is scheduled to have Left Wrist Nerve Decompression     Medical History  Past Medical History:   Diagnosis Date    Arthritis     Asthma     stable    Carpal tunnel syndrome on right     s/p RIGHT CARPAL TUNNEL RELEASE 24    GERD (gastroesophageal reflux disease)     under control    Hypertension     Left carpal tunnel syndrome     Plan: Left Wrist Nerve Decompression 25            Surgical History  Past Surgical History:   Procedure Laterality Date    CARPAL TUNNEL RELEASE Right 2024    LUMBAR LAMINECTOMY  2024    LAMNOTMY INCL W/DCMPRSN NRV ROOT 1 INTRSPC LUMBR  DISCOTOMY LUMBAR LEVEL 1    ROTATOR CUFF REPAIR Left     TRIGGER FINGER RELEASE  2024    RIGHT MIDDLE FINGER A-1 PULLEY RELEASE & RIGHT RING FINGER A-1 PULLEY RELEASE             Family history:  Family History   Problem Relation Name Age of Onset    Hypertension Mother      Diabetes Mother      Prostate cancer Father      No Known Problems Sister      Heart disease Maternal Grandfather          Social history:  Social History     Socioeconomic History    Marital status:       Spouse name: Not on file    Number of children: Not on file    Years of education: Not on file    Highest education level: Not on file   Occupational History    Not on file   Tobacco Use    Smoking status: Former     Current packs/day: 0.00     Average packs/day: 0.5 packs/day for 14.0 years (7.0 ttl pk-yrs)     Types: Cigarettes     Start date:      Quit date:      Years since quittin.1    Smokeless tobacco: Never   Vaping Use    Vaping status: Never Used   Substance and Sexual Activity    Alcohol use: Yes     Comment: rare social    Drug use: Never    Sexual activity: Defer   Other Topics Concern    Not on file   Social History Narrative    Not on file     Social Drivers of Health     Financial Resource Strain: Low Risk  (2020)    Received from [facility]    Overall Financial Resource Strain (CARDIA)     Difficulty of Paying Living Expenses: Not hard at all   Food Insecurity: Unknown (2024)    Received from [facility]    Hunger Vital Sign     Worried About Running Out of Food in the Last Year: Never true     Ran Out of Food in the Last Year: Not on file   Transportation Needs: No Transportation Needs (2020)    Received from [facility]    PRAPARE - Transportation     Lack of Transportation (Medical): No     Lack of Transportation (Non-Medical): No   Physical Activity: Sufficiently Active (2020)    Received from [facility]    Exercise Vital Sign     Days of Exercise per Week: 5 days     Minutes of Exercise per Session: 60 min   Stress: No Stress Concern Present (2020)    Received from [facility]    [place] [employer] - Occupational Stress Questionnaire     Feeling of Stress : Not at all   Social Connections: Moderately Integrated (2020)    Received from [facility]    Social Connection and Isolation Panel [NHANES]      "Frequency of Communication with Friends and Family: More than three times a week     Frequency of Social Gatherings with Friends and Family: Once a week     Attends Holiness Services: More than 4 times per year     Active Member of Clubs or Organizations: No     Attends Club or Organization Meetings: Never     Marital Status:    Intimate Partner Violence: Not on file   Housing Stability: Not on file        Current Outpatient Medications   Medication Instructions    albuterol 90 mcg/actuation inhaler 1-2 puffs, Every 6 hours PRN    budesonide-formoteroL (Symbicort) 160-4.5 mcg/actuation inhaler 2 puffs, 2 times daily    cetirizine (ZYRTEC) 10 mg, As needed    EPINEPHrine (EPIPEN) 0.3 mg     mg tablet TAKE ONE TABLET BY MOUTH EVERY 6 HOURS AS NEEDED FOR PAIN FOR UP TO 10 DAYS    lansoprazole (PREVACID SOLUTAB) 15 mg, oral, Daily, Dissolve on tongue before swallowing particles; do not chew, cut, break, or swallow whole.    lisinopril 10 mg, Daily          Visit Vitals  /88   Pulse 58   Temp 36.3 °C (97.4 °F)   Resp 18   Ht 1.77 m (5' 9.69\")   Wt 102 kg (224 lb 13.9 oz)   SpO2 98%   BMI 32.56 kg/m²   Smoking Status Former   BSA 2.24 m²         Review of Systems   Constitutional: Negative.    HENT: Negative.     Eyes: Negative.    Respiratory: Negative.     Cardiovascular: Negative.         METS 4   own lawn care / regular exercise Without chest pain / SOB    Gastrointestinal: Negative.    Endocrine: Negative.    Genitourinary: Negative.    Musculoskeletal: Negative.         Pain / numbness both hands/wrists   Skin: Negative.    Allergic/Immunologic: Negative.    Neurological: Negative.    Hematological: Negative.    Psychiatric/Behavioral: Negative.          Physical Exam  Constitutional:       Appearance: Normal appearance.   HENT:      Head: Normocephalic and atraumatic.      Right Ear: External ear normal.      Left Ear: External ear normal.      Nose: Nose normal.      Mouth/Throat:      " Pharynx: Oropharynx is clear.   Eyes:      Conjunctiva/sclera: Conjunctivae normal.   Cardiovascular:      Rate and Rhythm: Normal rate and regular rhythm.      Heart sounds: Normal heart sounds.   Pulmonary:      Effort: Pulmonary effort is normal.      Breath sounds: Normal breath sounds.   Abdominal:      General: Abdomen is flat.      Palpations: Abdomen is soft.   Musculoskeletal:         General: Normal range of motion.      Cervical back: Normal range of motion and neck supple.   Skin:     General: Skin is warm and dry.   Neurological:      General: No focal deficit present.      Mental Status: He is alert and oriented to person, place, and time.   Psychiatric:         Mood and Affect: Mood normal.         Behavior: Behavior normal.         Thought Content: Thought content normal.         Judgment: Judgment normal.          PAT AIRWAY:   Airway:     Mallampati::  II    Neck ROM::  Full       CBC 12/7/25  WBC  3.70 - 11.00 k/uL 5.11   RBC  4.20 - 6.00 m/uL 4.45   Hemoglobin  13.0 - 17.0 g/dL 13.2   Hematocrit  39.0 - 51.0 % 39.3   MCV  80.0 - 100.0 fL 88.3   MCH  26.0 - 34.0 pg 29.7   MCHC  30.5 - 36.0 g/dL 33.6   RDW-CV  11.5 - 15.0 % 11.9   Platelet Count  150 - 400 k/uL 236   MPV  9.0 - 12.7 fL 9.4   Absolute nRBC  <0.01 k/uL <0.01     [facility] 12/7/25  Glucose  74 - 99 mg/dL 115 High      BUN  9 - 24 mg/dL 14   Creatinine  0.73 - 1.22 mg/dL 0.80   Sodium  136 - 144 mmol/L 140   Potassium  3.7 - 5.1 mmol/L 3.7   Chloride  98 - 107 mmol/L 105   CO2  22 - 30 mmol/L 25   Anion Gap  8 - 15 mmol/L 10   Calcium, Total  8.5 - 10.2 mg/dL 9.2   Estimated Glomerular Filtration Rate  >=60 mL/min/1.73m² 103     Encounter Date: 10/25/24   ECG 12 lead   Result Value    Ventricular Rate 54    Atrial Rate 54    FL Interval 190    QRS Duration 88    QT Interval 458    QTC Calculation(Bazett) 434    P Axis 63    R Axis -24    T Axis -8    QRS Count 8    Q Onset 221    P Onset 126    P Offset 153    T Offset 450    QTC Fredericia  441    Narrative    Sinus bradycardia with sinus arrhythmia  Voltage criteria for left ventricular hypertrophy  Nonspecific T wave abnormality  Abnormal ECG  When compared with ECG of 05-NOV-2022 22:16,  Previous ECG has undetermined rhythm, needs review  Confirmed by Jovanni Spence ([id number]) on 10/30/2024 12:23:05 PM        Patient is scheduled to have Left Wrist Nerve Decompression     Cardiovascular  METS: 4   RCRI: 0  , 3.9 % Risk of MACE  Caprini: 4      HTN- lisinopril HOLD 24 hours prior to surgery     Pulmonary  Stop Bang score is 3 placing patient at moderate risk for SHAMEKA  ARISCAT: <26 points, 1.6% risk of in-hospital postoperative pulmonary complication  PRODIGY: Moderate risk for opioid induced respiratory depression    Asthma - inhalers Continue DOS     Hematology       Patient instructed to ambulate as soon as possible postoperatively to decrease thromboembolic risk.      Initiate mechanical DVT prophylaxis as soon as possible and initiate chemical prophylaxis when deemed safe from a bleeding standpoint post surgery.       VTE prophylaxis per surgical team     GI  GERD- lansoprazole Continue DOS     Tests ordered in PAT: (cbc, bmp done 12/5  EKG done 10/25/24)  LABS REVIEWED: unremarkable     Risk assessment complete.  Patient is scheduled for a low surgical risk procedure.        Preoperative medication instructions were provided and reviewed with the patient.  Any additional testing or evaluation was explained to the patient.  Nothing by mouth instructions were discussed and patient's questions were answered prior to conclusion to this encounter.  Patient verbalized understanding of preoperative instructions given in preadmission testing; discharge instructions available in EMR.

## 2025-02-12 ENCOUNTER — HOSPITAL ENCOUNTER (OUTPATIENT)
Facility: HOSPITAL | Age: 60
Setting detail: OUTPATIENT SURGERY
Discharge: HOME | End: 2025-02-12
Attending: ORTHOPAEDIC SURGERY | Admitting: ORTHOPAEDIC SURGERY
Payer: COMMERCIAL

## 2025-02-12 ENCOUNTER — ANESTHESIA EVENT (OUTPATIENT)
Dept: OPERATING ROOM | Facility: HOSPITAL | Age: 60
End: 2025-02-12
Payer: COMMERCIAL

## 2025-02-12 ENCOUNTER — ANESTHESIA (OUTPATIENT)
Dept: OPERATING ROOM | Facility: HOSPITAL | Age: 60
End: 2025-02-12
Payer: COMMERCIAL

## 2025-02-12 VITALS
DIASTOLIC BLOOD PRESSURE: 62 MMHG | TEMPERATURE: 97.2 F | BODY MASS INDEX: 32.95 KG/M2 | HEIGHT: 70 IN | HEART RATE: 71 BPM | SYSTOLIC BLOOD PRESSURE: 116 MMHG | RESPIRATION RATE: 12 BRPM | WEIGHT: 230.16 LBS | OXYGEN SATURATION: 98 %

## 2025-02-12 DIAGNOSIS — G56.02 LEFT CARPAL TUNNEL SYNDROME: Primary | ICD-10-CM

## 2025-02-12 PROCEDURE — 7100000009 HC PHASE TWO TIME - INITIAL BASE CHARGE: Performed by: ORTHOPAEDIC SURGERY

## 2025-02-12 PROCEDURE — 2500000004 HC RX 250 GENERAL PHARMACY W/ HCPCS (ALT 636 FOR OP/ED): Performed by: ANESTHESIOLOGIST ASSISTANT

## 2025-02-12 PROCEDURE — A64721 PR REVISE MEDIAN N/CARPAL TUNNEL SURG: Performed by: ANESTHESIOLOGIST ASSISTANT

## 2025-02-12 PROCEDURE — 7100000002 HC RECOVERY ROOM TIME - EACH INCREMENTAL 1 MINUTE: Performed by: ORTHOPAEDIC SURGERY

## 2025-02-12 PROCEDURE — 7100000010 HC PHASE TWO TIME - EACH INCREMENTAL 1 MINUTE: Performed by: ORTHOPAEDIC SURGERY

## 2025-02-12 PROCEDURE — 2500000005 HC RX 250 GENERAL PHARMACY W/O HCPCS: Performed by: ORTHOPAEDIC SURGERY

## 2025-02-12 PROCEDURE — 3600000008 HC OR TIME - EACH INCREMENTAL 1 MINUTE - PROCEDURE LEVEL THREE: Performed by: ORTHOPAEDIC SURGERY

## 2025-02-12 PROCEDURE — 3600000003 HC OR TIME - INITIAL BASE CHARGE - PROCEDURE LEVEL THREE: Performed by: ORTHOPAEDIC SURGERY

## 2025-02-12 PROCEDURE — 2500000004 HC RX 250 GENERAL PHARMACY W/ HCPCS (ALT 636 FOR OP/ED): Performed by: ORTHOPAEDIC SURGERY

## 2025-02-12 PROCEDURE — 7100000001 HC RECOVERY ROOM TIME - INITIAL BASE CHARGE: Performed by: ORTHOPAEDIC SURGERY

## 2025-02-12 PROCEDURE — 64721 CARPAL TUNNEL SURGERY: CPT | Performed by: ORTHOPAEDIC SURGERY

## 2025-02-12 PROCEDURE — A64721 PR REVISE MEDIAN N/CARPAL TUNNEL SURG: Performed by: STUDENT IN AN ORGANIZED HEALTH CARE EDUCATION/TRAINING PROGRAM

## 2025-02-12 PROCEDURE — 3700000002 HC GENERAL ANESTHESIA TIME - EACH INCREMENTAL 1 MINUTE: Performed by: ORTHOPAEDIC SURGERY

## 2025-02-12 PROCEDURE — 3700000001 HC GENERAL ANESTHESIA TIME - INITIAL BASE CHARGE: Performed by: ORTHOPAEDIC SURGERY

## 2025-02-12 RX ORDER — LIDOCAINE HYDROCHLORIDE 10 MG/ML
INJECTION, SOLUTION INFILTRATION; PERINEURAL AS NEEDED
Status: DISCONTINUED | OUTPATIENT
Start: 2025-02-12 | End: 2025-02-12 | Stop reason: HOSPADM

## 2025-02-12 RX ORDER — FENTANYL CITRATE 50 UG/ML
INJECTION, SOLUTION INTRAMUSCULAR; INTRAVENOUS AS NEEDED
Status: DISCONTINUED | OUTPATIENT
Start: 2025-02-12 | End: 2025-02-12

## 2025-02-12 RX ORDER — CEFAZOLIN 1 G/1
INJECTION, POWDER, FOR SOLUTION INTRAVENOUS AS NEEDED
Status: DISCONTINUED | OUTPATIENT
Start: 2025-02-12 | End: 2025-02-12

## 2025-02-12 RX ORDER — ONDANSETRON HYDROCHLORIDE 2 MG/ML
4 INJECTION, SOLUTION INTRAVENOUS ONCE AS NEEDED
Status: DISCONTINUED | OUTPATIENT
Start: 2025-02-12 | End: 2025-02-12 | Stop reason: HOSPADM

## 2025-02-12 RX ORDER — MIDAZOLAM HYDROCHLORIDE 1 MG/ML
INJECTION INTRAMUSCULAR; INTRAVENOUS AS NEEDED
Status: DISCONTINUED | OUTPATIENT
Start: 2025-02-12 | End: 2025-02-12

## 2025-02-12 RX ORDER — LIDOCAINE HYDROCHLORIDE 10 MG/ML
0.1 INJECTION, SOLUTION EPIDURAL; INFILTRATION; INTRACAUDAL; PERINEURAL ONCE
Status: DISCONTINUED | OUTPATIENT
Start: 2025-02-12 | End: 2025-02-12 | Stop reason: HOSPADM

## 2025-02-12 RX ORDER — DIPHENHYDRAMINE HYDROCHLORIDE 50 MG/ML
12.5 INJECTION INTRAMUSCULAR; INTRAVENOUS ONCE AS NEEDED
Status: DISCONTINUED | OUTPATIENT
Start: 2025-02-12 | End: 2025-02-12 | Stop reason: HOSPADM

## 2025-02-12 RX ORDER — OXYCODONE AND ACETAMINOPHEN 5; 325 MG/1; MG/1
1 TABLET ORAL EVERY 6 HOURS PRN
Qty: 10 TABLET | Refills: 0 | Status: SHIPPED | OUTPATIENT
Start: 2025-02-12 | End: 2025-02-15

## 2025-02-12 RX ORDER — LABETALOL HYDROCHLORIDE 5 MG/ML
5 INJECTION, SOLUTION INTRAVENOUS ONCE AS NEEDED
Status: DISCONTINUED | OUTPATIENT
Start: 2025-02-12 | End: 2025-02-12 | Stop reason: HOSPADM

## 2025-02-12 RX ORDER — SODIUM CHLORIDE, SODIUM LACTATE, POTASSIUM CHLORIDE, CALCIUM CHLORIDE 600; 310; 30; 20 MG/100ML; MG/100ML; MG/100ML; MG/100ML
INJECTION, SOLUTION INTRAVENOUS CONTINUOUS PRN
Status: DISCONTINUED | OUTPATIENT
Start: 2025-02-12 | End: 2025-02-12

## 2025-02-12 RX ORDER — PROPOFOL 10 MG/ML
INJECTION, EMULSION INTRAVENOUS CONTINUOUS PRN
Status: DISCONTINUED | OUTPATIENT
Start: 2025-02-12 | End: 2025-02-12

## 2025-02-12 RX ORDER — SODIUM CHLORIDE 0.9 G/100ML
INJECTION, SOLUTION IRRIGATION AS NEEDED
Status: DISCONTINUED | OUTPATIENT
Start: 2025-02-12 | End: 2025-02-12 | Stop reason: HOSPADM

## 2025-02-12 RX ORDER — OXYCODONE HYDROCHLORIDE 5 MG/1
5 TABLET ORAL EVERY 4 HOURS PRN
Status: DISCONTINUED | OUTPATIENT
Start: 2025-02-12 | End: 2025-02-12 | Stop reason: HOSPADM

## 2025-02-12 RX ORDER — SODIUM CHLORIDE, SODIUM LACTATE, POTASSIUM CHLORIDE, CALCIUM CHLORIDE 600; 310; 30; 20 MG/100ML; MG/100ML; MG/100ML; MG/100ML
100 INJECTION, SOLUTION INTRAVENOUS CONTINUOUS
Status: DISCONTINUED | OUTPATIENT
Start: 2025-02-12 | End: 2025-02-12 | Stop reason: HOSPADM

## 2025-02-12 RX ADMIN — FENTANYL CITRATE 50 MCG: 50 INJECTION, SOLUTION INTRAMUSCULAR; INTRAVENOUS at 11:23

## 2025-02-12 RX ADMIN — SODIUM CHLORIDE, SODIUM LACTATE, POTASSIUM CHLORIDE, AND CALCIUM CHLORIDE: .6; .31; .03; .02 INJECTION, SOLUTION INTRAVENOUS at 07:38

## 2025-02-12 RX ADMIN — MIDAZOLAM HYDROCHLORIDE 2 MG: 1 INJECTION, SOLUTION INTRAMUSCULAR; INTRAVENOUS at 11:23

## 2025-02-12 RX ADMIN — PROPOFOL 100 MCG/KG/MIN: 10 INJECTION, EMULSION INTRAVENOUS at 11:25

## 2025-02-12 RX ADMIN — PROPOFOL 30 MG: 10 INJECTION, EMULSION INTRAVENOUS at 11:27

## 2025-02-12 RX ADMIN — FENTANYL CITRATE 50 MCG: 50 INJECTION, SOLUTION INTRAMUSCULAR; INTRAVENOUS at 11:42

## 2025-02-12 RX ADMIN — CEFAZOLIN 2 G: 1 INJECTION, POWDER, FOR SOLUTION INTRAMUSCULAR; INTRAVENOUS at 11:30

## 2025-02-12 ASSESSMENT — PAIN - FUNCTIONAL ASSESSMENT
PAIN_FUNCTIONAL_ASSESSMENT: 0-10
PAIN_FUNCTIONAL_ASSESSMENT: UNABLE TO SELF-REPORT
PAIN_FUNCTIONAL_ASSESSMENT: 0-10

## 2025-02-12 ASSESSMENT — COLUMBIA-SUICIDE SEVERITY RATING SCALE - C-SSRS
2. HAVE YOU ACTUALLY HAD ANY THOUGHTS OF KILLING YOURSELF?: NO
1. IN THE PAST MONTH, HAVE YOU WISHED YOU WERE DEAD OR WISHED YOU COULD GO TO SLEEP AND NOT WAKE UP?: NO
6. HAVE YOU EVER DONE ANYTHING, STARTED TO DO ANYTHING, OR PREPARED TO DO ANYTHING TO END YOUR LIFE?: NO

## 2025-02-12 ASSESSMENT — PAIN SCALES - GENERAL
PAINLEVEL_OUTOF10: 0 - NO PAIN
PAINLEVEL_OUTOF10: 6

## 2025-02-12 NOTE — Clinical Note
February 12, 2025     Patient: Ha ANDERSON Means   YOB: 1965   Date of Visit: 2/3/2025       To Whom It May Concern:    It is my medical opinion that Ha Means {Work release (duty restriction):28225}.    If you have any questions or concerns, please don't hesitate to call.         Sincerely,        No name on file    CC: No Recipients

## 2025-02-12 NOTE — ANESTHESIA POSTPROCEDURE EVALUATION
Patient: Ha Hdz    Procedure Summary       Date: 02/12/25 Room / Location: [facility] OR 17 / Virtual [facility] OR    Anesthesia Start: 1123 Anesthesia Stop: 1205    Procedure: Left Wrist Nerve Decompression (Left: Wrist) Diagnosis:       Left carpal tunnel syndrome      (Left carpal tunnel syndrome [G56.02])    Surgeons: Tylor Boland MD Responsible Provider: Kade Walls MD    Anesthesia Type: MAC ASA Status: 3            Anesthesia Type: MAC    Vitals Value Taken Time   /74 02/12/25 1208   Temp 36.2 02/12/25 1208   Pulse 60 02/12/25 1208   Resp 14 02/12/25 1208   SpO2 99 % 02/12/25 1208   Vitals shown include unfiled device data.    Anesthesia Post Evaluation    Patient location during evaluation: bedside  Patient participation: complete - patient participated  Level of consciousness: awake  Pain management: adequate  Multimodal analgesia pain management approach  Airway patency: patent  Cardiovascular status: stable  Respiratory status: spontaneous ventilation and unassisted  Hydration status: acceptable  Postoperative Nausea and Vomiting: none  Comments: No significant PONV.        No notable events documented.

## 2025-02-12 NOTE — LETTER
February 12, 2025     Patient: Ha Hdz   YOB: 1965   Date of Visit: 2/12/25       To Whom It May Concern:    This patient was seen for surgical procedure today requiring anesthesia.  Ludy Dias is required to be present with patient for 24 hours post procedure.  If you have any questions or concerns, please don't hesitate to call.         Sincerely,        Opal Zaidi RN    [phone]

## 2025-02-12 NOTE — ANESTHESIA PREPROCEDURE EVALUATION
Patient: Ha Hdz    Procedure Information       Date/Time: 02/12/25 1130    Procedure: Left Wrist Nerve Decompression (Left: Wrist)    Location: [facility] OR 17 / Virtual [facility] OR    Surgeons: Tylor Boland MD            Relevant Problems   Neuro   (+) Carpal tunnel syndrome on left   (+) Carpal tunnel syndrome on right   (+) Left carpal tunnel syndrome      Musculoskeletal   (+) Carpal tunnel syndrome on left   (+) Carpal tunnel syndrome on right   (+) Left carpal tunnel syndrome       Clinical information reviewed:   Tobacco  Allergies  Meds   Med Hx  Surg Hx   Fam Hx  Soc Hx        NPO Detail:  NPO/Void Status  Date of Last Liquid: 02/12/25  Time of Last Liquid: 0715  Date of Last Solid: 02/11/25  Time of Last Solid: 2000         Physical Exam    Airway  Mallampati: II  TM distance: >3 FB  Neck ROM: full     Cardiovascular   Rhythm: regular  Rate: normal     Dental    Pulmonary   Breath sounds clear to auscultation     Abdominal            Anesthesia Plan    History of general anesthesia?: yes  History of complications of general anesthesia?: no    ASA 3     MAC     intravenous induction   Anesthetic plan and risks discussed with patient.    Plan discussed with CRNA.

## 2025-02-14 NOTE — OP NOTE
Left Wrist Nerve Decompression (L) Operative Note     Date: 2025  OR Location: [facility] OR    Name: Ha Hdz, : 1965, Age: 59 y.o., MRN: [id number], Sex: male    Diagnosis  Pre-op Diagnosis      * Left carpal tunnel syndrome [G56.02] Post-op Diagnosis     * Left carpal tunnel syndrome [G56.02]     Procedures  Left Wrist Nerve Decompression  78837 - SC NEUROPLASTY &/TRANSPOS MEDIAN NRV CARPAL TUNNE      Surgeons      * Tylor Boland - Primary    Resident/Fellow/Other Assistant:  Surgeons and Role:     * Damián Farris MD - Resident - Assisting    Staff:   Circulator: Adina Pantoja Person: Vianney Faulkner Circulator: Sharlene    Anesthesia Staff: Anesthesiologist: Kade Walls MD  C-AA: CAITLIN White    Procedure Summary  Anesthesia: Monitor Anesthesia Care  ASA: III  Estimated Blood Loss: 0mL  Intra-op Medications:   Administrations occurring from 1130 to 1230 on 25:   Medication Name Total Dose   sodium chloride 0.9 % irrigation solution 1,000 mL   lidocaine (Xylocaine) 10 mg/mL (1 %) injection 10 mL   ceFAZolin (Ancef) vial 1 g 2 g   fentaNYL (Sublimaze) injection 50 mcg/mL 50 mcg   LR infusion Cannot be calculated   propofol (Diprivan) injection 10 mg/mL 214.02 mg              Anesthesia Record               Intraprocedure I/O Totals          Intake    LR infusion 250.00 mL    Total Intake 250 mL       Output    Est. Blood Loss 1 mL    Total Output 1 mL       Net    Net Volume 249 mL          Specimen: No specimens collected              Drains and/or Catheters: * None in log *    Tourniquet Times:     Total Tourniquet Time Documented:  Arm - Upper (Left) - 15 minutes  Total: Arm - Upper (Left) - 15 minutes      Implants:     Findings: thick ligament    Indications: Ha Hdz is an 59 y.o. male who is having surgery for Left carpal tunnel syndrome [G56.02].     The patient was seen in the preoperative area. The risks, benefits, complications, treatment  options, non-operative alternatives, expected recovery and outcomes were discussed with the patient. The possibilities of reaction to medication, pulmonary aspiration, injury to surrounding structures, bleeding, recurrent infection, the need for additional procedures, failure to diagnose a condition, and creating a complication requiring transfusion or operation were discussed with the patient. The patient concurred with the proposed plan, giving informed consent.  The site of surgery was properly noted/marked if necessary per policy. The patient has been actively warmed in preoperative area. Preoperative antibiotics have been ordered and given within 1 hours of incision. Venous thrombosis prophylaxis have been ordered including unilateral sequential compression device    Procedure Details: Preoperative huddle was performed with patient notification procedure and site verification.  Patient given a MAC anesthetic.  The left arm was sterilely prepped draped usual fashion.  A brachial tourniquet was inflated.  Incision was made in line with the fourth digit not crossing the wrist crease.  Superficial structures were divided  Transverse carpal ligament was identified and then incised decompressing underlying median nerve the wound was irrigated and closed using interrupted nonabsorbable sutures.  Sterile dressing applied.  Complications:  None; patient tolerated the procedure well.    Disposition: PACU - hemodynamically stable.  Condition: stable                 Additional Details:     Attending Attestation: I was present for the entire procedure.    Tylor Boland  Phone Number: [phone]

## 2025-02-20 ENCOUNTER — OFFICE VISIT (OUTPATIENT)
Dept: ORTHOPEDIC SURGERY | Facility: CLINIC | Age: 60
End: 2025-02-20
Payer: COMMERCIAL

## 2025-02-20 DIAGNOSIS — G56.02 CARPAL TUNNEL SYNDROME ON LEFT: ICD-10-CM

## 2025-02-20 PROCEDURE — 99024 POSTOP FOLLOW-UP VISIT: CPT | Performed by: ORTHOPAEDIC SURGERY

## 2025-02-20 PROCEDURE — 1036F TOBACCO NON-USER: CPT | Performed by: ORTHOPAEDIC SURGERY

## 2025-02-20 NOTE — PROGRESS NOTES
Follow-up left carpal tunnel release he fell incision opened up a little bit no sign of infection Steri-Stripped the wound discussed wound care recommended OT for persistent symptoms follow-up as needed

## 2025-03-17 ENCOUNTER — DOCUMENTATION (OUTPATIENT)
Dept: OCCUPATIONAL THERAPY | Facility: CLINIC | Age: 60
End: 2025-03-17
Payer: COMMERCIAL

## 2025-03-17 NOTE — PROGRESS NOTES
Occupational Therapy                 Therapy Communication Note    Patient Name: Ha Hdz  MRN: [id number]  Department:   Room: Room/bed info not found  Today's Date: 3/17/2025     Discipline: Occupational Therapy        Missed Time: No Show    Comment:   No show to OT evaluation

## 2025-03-18 ENCOUNTER — OFFICE VISIT (OUTPATIENT)
Dept: ORTHOPEDIC SURGERY | Facility: CLINIC | Age: 60
End: 2025-03-18
Payer: COMMERCIAL

## 2025-03-18 DIAGNOSIS — G56.02 CARPAL TUNNEL SYNDROME ON LEFT: ICD-10-CM

## 2025-03-18 DIAGNOSIS — M75.100 TEAR OF ROTATOR CUFF, UNSPECIFIED LATERALITY, UNSPECIFIED TEAR EXTENT, UNSPECIFIED WHETHER TRAUMATIC: Primary | ICD-10-CM

## 2025-03-18 PROCEDURE — 99213 OFFICE O/P EST LOW 20 MIN: CPT | Performed by: ORTHOPAEDIC SURGERY

## 2025-03-18 PROCEDURE — 1036F TOBACCO NON-USER: CPT | Performed by: ORTHOPAEDIC SURGERY

## 2025-03-18 PROCEDURE — 20610 DRAIN/INJ JOINT/BURSA W/O US: CPT | Performed by: ORTHOPAEDIC SURGERY

## 2025-03-18 RX ORDER — LIDOCAINE HYDROCHLORIDE 20 MG/ML
2 INJECTION, SOLUTION INFILTRATION; PERINEURAL
Status: COMPLETED | OUTPATIENT
Start: 2025-03-18 | End: 2025-03-18

## 2025-03-18 RX ORDER — METHYLPREDNISOLONE ACETATE 40 MG/ML
40 INJECTION, SUSPENSION INTRA-ARTICULAR; INTRALESIONAL; INTRAMUSCULAR; SOFT TISSUE
Status: COMPLETED | OUTPATIENT
Start: 2025-03-18 | End: 2025-03-18

## 2025-03-18 RX ADMIN — METHYLPREDNISOLONE ACETATE 40 MG: 40 INJECTION, SUSPENSION INTRA-ARTICULAR; INTRALESIONAL; INTRAMUSCULAR; SOFT TISSUE at 10:13

## 2025-03-18 RX ADMIN — LIDOCAINE HYDROCHLORIDE 2 ML: 20 INJECTION, SOLUTION INFILTRATION; PERINEURAL at 10:13

## 2025-03-18 NOTE — PROGRESS NOTES
Chief Complaint   Chief Complaint   Patient presents with    Left Hand - Post-op         HPI:      Ha Hdz is a pleasant 59 y.o. year-old male who is seen today for bilateral shoulder pain for which she periodically gets injections and left hand pain immediately after his carpal tunnel release he fell on outstretched hand has had some ulnar-sided wrist and hand pain since and the wound opened up a bit he had been scheduled for OT but has not started yet    Review of Systems all other body systems have been reviewed and are negative for complaint.    There were no vitals filed for this visit.    Past Medical History:   Diagnosis Date    Arthritis     Asthma     stable    Carpal tunnel syndrome on right     s/p RIGHT CARPAL TUNNEL RELEASE 11/5/24    GERD (gastroesophageal reflux disease)     under control    Hypertension     Left carpal tunnel syndrome     Plan: Left Wrist Nerve Decompression 2/12/25     Patient Active Problem List   Diagnosis    Carpal tunnel syndrome on right    Trigger ring finger of right hand    Trigger finger, right middle finger    Carpal tunnel syndrome on left    Left carpal tunnel syndrome       Medication Documentation Review Audit       Reviewed by Ar Landaverde MA (Medical Assistant) on 03/18/25 at 0944      Medication Order Taking? Sig Documenting Provider Last Dose Status   albuterol 90 mcg/actuation inhaler [id number] No Inhale 1-2 puffs every 6 hours if needed. Historical Provider, MD Past Month Active   budesonide-formoteroL (Symbicort) 160-4.5 mcg/actuation inhaler [id number] No Inhale 2 puffs 2 times a day. Historical Provider, MD Past Month Active   cetirizine (ZyrTEC) 10 mg tablet [id number] No Take 1 tablet (10 mg) by mouth if needed for rhinitis or allergies. Historical Provider, MD Past Week Active   EPINEPHrine 0.3 mg/0.3 mL injection syringe [id number] No Inject 0.3 mL (0.3 mg) into the muscle.   Patient not taking: Reported on 2/7/2025    Historical Provider, MD Unknown  "Active    mg tablet [id number] No  Historical Provider, MD Past Month Active   lansoprazole (Prevacid SoluTab) 15 mg disintegrating tablet [id number] No Take 1 tablet (15 mg) by mouth once daily. Dissolve on tongue before swallowing particles; do not chew, cut, break, or swallow whole. Historical Provider, MD Past Week Active   lisinopril 10 mg tablet [id number] No Take 1 tablet (10 mg) by mouth once daily. Historical Provider, MD 2025 Active                    Allergies   Allergen Reactions    Bee Venom Protein (Honey Bee) Anaphylaxis    Adhesive Other     bandaids burn skin    Latex, Natural Rubber Rash    Penicillins Hives     Stated \"as child \"       Social History     Socioeconomic History    Marital status:      Spouse name: Not on file    Number of children: Not on file    Years of education: Not on file    Highest education level: Not on file   Occupational History    Not on file   Tobacco Use    Smoking status: Former     Current packs/day: 0.00     Average packs/day: 0.5 packs/day for 14.0 years (7.0 ttl pk-yrs)     Types: Cigarettes     Start date:      Quit date:      Years since quittin.2    Smokeless tobacco: Never   Vaping Use    Vaping status: Never Used   Substance and Sexual Activity    Alcohol use: Yes     Comment: rare social    Drug use: Never    Sexual activity: Defer   Other Topics Concern    Not on file   Social History Narrative    Not on file     Social Drivers of Health     Financial Resource Strain: Low Risk  (2020)    [facility]    Overall Financial Resource Strain (CARDIA)     Difficulty of Paying Living Expenses: Not hard at all   Food Insecurity: Unknown (2024)    Received from [facility]    Hunger Vital Sign     Worried About Running Out of Food in the Last Year: Never true     Ran Out of Food in the Last Year: Not on file   Transportation Needs: No Transportation Needs (2020)    Received from " "[facility]    PRAPARE - Transportation     Lack of Transportation (Medical): No     Lack of Transportation (Non-Medical): No   Physical Activity: Sufficiently Active (4/30/2020)    Received from [facility]    Exercise Vital Sign     Days of Exercise per Week: 5 days     Minutes of Exercise per Session: 60 min   Stress: No Stress Concern Present (4/30/2020)    Received from [facility]    [place] [employer] - Occupational Stress Questionnaire     Feeling of Stress : Not at all   Social Connections: Moderately Integrated (4/30/2020)    Received from [facility]    Social Connection and Isolation Panel [NHANES]     Frequency of Communication with Friends and Family: More than three times a week     Frequency of Social Gatherings with Friends and Family: Once a week     Attends Roman [employer] Services: More than 4 times per year     Active Member of Clubs or Organizations: No     Attends Club or Organization Meetings: Never     Marital Status:    Intimate Partner Violence: Not on file   Housing Stability: Not on file       Past Surgical History:   Procedure Laterality Date    CARPAL TUNNEL RELEASE Right 11/05/2024    LUMBAR LAMINECTOMY  03/18/2024    LAMNOTMY INCL W/DCMPRSN NRV ROOT 1 INTRSPC LUMBR  DISCOTOMY LUMBAR LEVEL 1    ROTATOR CUFF REPAIR Left     TRIGGER FINGER RELEASE  11/05/2024    RIGHT MIDDLE FINGER A-1 PULLEY RELEASE & RIGHT RING FINGER A-1 PULLEY RELEASE       There is no height or weight on file to calculate BMI.    HgA1c:   No results found for: \"HGBA1C\", \"[id number]\"    Physical Exam:  Awake alert oriented appropriate  Incision is healing up there is no evidence of active infection he is locally tender directly over the volar ulnar aspect of the wrist and hand and has been been self for several weeks            Imaging:  No new imaging today        Impression/Plan:  Rule out occult " carpal fracture check CT scan given negative x-rays  Injected both shoulders  L Inj/Asp: bilateral subacromial bursa on 3/18/2025 10:13 AM  Indications: pain  Details: 21 G needle, lateral approach  Medications (Right): 40 mg methylPREDNISolone acetate 40 mg/mL; 2 mL lidocaine 20 mg/mL (2 %)  Medications (Left): 40 mg methylPREDNISolone acetate 40 mg/mL; 2 mL lidocaine 20 mg/mL (2 %)

## 2025-04-08 ENCOUNTER — HOSPITAL ENCOUNTER (OUTPATIENT)
Dept: RADIOLOGY | Facility: CLINIC | Age: 60
Discharge: HOME | End: 2025-04-08
Payer: COMMERCIAL

## 2025-04-08 DIAGNOSIS — G56.02 CARPAL TUNNEL SYNDROME ON LEFT: ICD-10-CM

## 2025-04-08 PROCEDURE — 73200 CT UPPER EXTREMITY W/O DYE: CPT | Mod: LT

## 2025-04-08 PROCEDURE — 73200 CT UPPER EXTREMITY W/O DYE: CPT | Mod: LEFT SIDE | Performed by: STUDENT IN AN ORGANIZED HEALTH CARE EDUCATION/TRAINING PROGRAM

## 2025-04-21 ENCOUNTER — TELEPHONE (OUTPATIENT)
Dept: ORTHOPEDIC SURGERY | Facility: CLINIC | Age: 60
End: 2025-04-21
Payer: COMMERCIAL

## 2025-04-21 NOTE — TELEPHONE ENCOUNTER
S/p: rt carpal tunnel release dos: 11/5/24    FYI: I sent his occupational therapy orders to [facility] per his request...     He was not able to keep a consistent schedule at the previous location he tried because they booked out too far. This caused a gap in care and he has not been back since after surgery. He only went to 1 session.   Now he is experiencing issues with his rt hand again and wants to resume therapy.   Please advise whether he needs a follow up with you to see if there is anything else needing to be addressed.

## 2025-04-29 ENCOUNTER — APPOINTMENT (OUTPATIENT)
Dept: ORTHOPEDIC SURGERY | Facility: CLINIC | Age: 60
End: 2025-04-29
Payer: COMMERCIAL

## 2025-04-29 DIAGNOSIS — M65.312 TRIGGER FINGER OF LEFT THUMB: ICD-10-CM

## 2025-04-29 DIAGNOSIS — M25.532 BILATERAL WRIST PAIN: ICD-10-CM

## 2025-04-29 DIAGNOSIS — M65.311 TRIGGER FINGER OF RIGHT THUMB: Primary | ICD-10-CM

## 2025-04-29 DIAGNOSIS — M25.531 BILATERAL WRIST PAIN: ICD-10-CM

## 2025-04-29 RX ORDER — METHYLPREDNISOLONE ACETATE 40 MG/ML
20 INJECTION, SUSPENSION INTRA-ARTICULAR; INTRALESIONAL; INTRAMUSCULAR; SOFT TISSUE
Status: COMPLETED | OUTPATIENT
Start: 2025-04-29 | End: 2025-04-29

## 2025-04-29 RX ORDER — LIDOCAINE HYDROCHLORIDE 20 MG/ML
2 INJECTION, SOLUTION INFILTRATION; PERINEURAL
Status: COMPLETED | OUTPATIENT
Start: 2025-04-29 | End: 2025-04-29

## 2025-04-29 RX ADMIN — LIDOCAINE HYDROCHLORIDE 2 ML: 20 INJECTION, SOLUTION INFILTRATION; PERINEURAL at 09:38

## 2025-04-29 RX ADMIN — METHYLPREDNISOLONE ACETATE 20 MG: 40 INJECTION, SUSPENSION INTRA-ARTICULAR; INTRALESIONAL; INTRAMUSCULAR; SOFT TISSUE at 09:38

## 2025-04-29 NOTE — PROGRESS NOTES
Patient returns with bilateral hand pain had a recent CT scan which showed an avulsion fracture of the left wrist he has been trying to get to therapy but needs a different type of prescription  He is also getting triggering of both his thumbs she has had prior trigger digits as well as a carpal tunnel syndrome bilaterally  All other body systems have been reviewed and are negative for complaint.  Past medical,family and social histories have been reviewed and are up to date.    Examination: Awake alert oriented appropriate right hand he has tenderness over the A1 pulley with triggering of the thumb he also has some numbness along the thumb which he attributes to the prior carpal tunnel surgery  Left hand he has tenderness and triggering over the A1 pulley of the left thumb his sensation is improved since his left carpal tunnel release  Assessment bilateral carpal tunnel syndrome and bilateral trigger thumbs injected both thumbs today prescription for therapy May need revision surgery on the right  Hand / UE Inj/Asp: bilateral thumb A1 for trigger finger on 4/29/2025 9:38 AM  Indications: pain  Details: 25 G needle, volar approach  Medications (Right): 20 mg methylPREDNISolone acetate 40 mg/mL; 2 mL lidocaine 20 mg/mL (2 %)  Medications (Left): 20 mg methylPREDNISolone acetate 40 mg/mL; 2 mL lidocaine 20 mg/mL (2 %)

## 2025-05-22 ENCOUNTER — TELEPHONE (OUTPATIENT)
Dept: ORTHOPEDIC SURGERY | Facility: CLINIC | Age: 60
End: 2025-05-22
Payer: COMMERCIAL

## 2025-05-22 DIAGNOSIS — G56.01 CARPAL TUNNEL SYNDROME ON RIGHT: Primary | ICD-10-CM

## 2025-05-22 NOTE — TELEPHONE ENCOUNTER
PT WOULD LIKE TO TALK TO YOU IN REGARDS TO HIS SURGERY. HE STATED HIS FINGERS AND HAND ARE NUMB AND TINGLING.     [facility] ON 11-6-24        [phone]

## 2025-05-30 ENCOUNTER — PROCEDURE VISIT (OUTPATIENT)
Dept: NEUROLOGY | Facility: HOSPITAL | Age: 60
End: 2025-05-30
Payer: COMMERCIAL

## 2025-05-30 DIAGNOSIS — G56.01 CARPAL TUNNEL SYNDROME ON RIGHT: Primary | ICD-10-CM

## 2025-05-30 PROCEDURE — 76883 US NRV&ACC STRUX 1XTR COMPRE: CPT | Performed by: STUDENT IN AN ORGANIZED HEALTH CARE EDUCATION/TRAINING PROGRAM

## 2025-05-30 NOTE — PROGRESS NOTES
NEUROMUSCULAR ULTRASOUND OF THE RIGHT UPPER EXTREMITY    INDICATION:   Clinical Information: Underwent right carpal tunnel release surgery in November 2024. Initially the symptoms improved slightly however has had worsening of his symptoms since. He reports burning sensation in his 1st 3 digits and lateral half of palm with pain with light touch. Symptoms are bad at night. Reports numbness in the same digits as well as a sensation that his hand is swollen.    Neuromuscular ultrasound to be performed:  (a) to evaluate the echotexture and size of the right median nerve in the limb with attention to the carpal tunnel;  (b) to assess for any identifiable structural source of right median nerve compression;   (c) to assess adjacent structures around the right median nerve for abnormalities;  (d) to assess the right wrist joint for abnormalities.    HEIGHT: 5 ft 11 in  WEIGHT: 220 lbs.  HANDEDNESS: Right    COMPARISON:   None.    TECHNIQUE:  The right median nerve and accompanying structures were studied throughout their entire anatomic course in the limb from the wrist to the mid-arm, including real-time cine imaging. The examination was performed using a [employer] P[employer]nography ultrasound machine with a 15-6 MHz matrix linear transducer. Both axial and longitudinal views were obtained. The patient was examined supine with the arm extended and supinated. Cross sectional area (CSA) was measured within the epineurium, using the trace method. At locations where repeat measurements were taken, the mean value is reported below. Transverse and longitudinal images were obtained.     FINDINGS:  At the right wrist at the distal wrist crease (proximal carpal tunnel), the median nerve CSA was 15.2 mm2 (NL < 10 mm2; borderline 10-13 mm2; ABN > 13 mm2).    The echogenicity of the right median nerve at the wrist was reduced. The mobility of the right median nerve with flexion and extension of the fingers was reduced. Color Doppler of the right median  nerve showed normal median nerve vascularity. No abnormal tenosynovitis of the right flexor tendons was noted.    Using a longitudinal scan of the right median nerve at the wrist, a mild notch sign was seen under the flexor retinaculum in the distal carpal tunnel.    A right persistent median artery was not present. A right bifid median nerve was not present. No other abnormal mass or ganglia was appreciated in the right carpal tunnel. With extension of the fingers, there was no abnormal intrusion of the right flexor digitorum sublimis. With flexion of the fingers, there was no abnormal intrusion of the right lumbrical muscles.    In the mid-forearm, approximately 12 cm proximal to the distal wrist crease, the right median nerve was seen between the flexor digitorum sublimis and flexor digitorum profundus muscles. At the mid-forearm, the right median nerve CSA was 7.5 mm2. The ratio of the right median CSAs between the wrist and forearm (WFR) was 2.0 (NL < 1.5; borderline: 1.5 - 2.0). The echogenicity of the right median nerve in the forearm was normal.    The right median nerve was then followed proximal into the forearm and then to the mid-arm. At the antecubital fossa, the right median nerve CSA was 8.4 mm2. In the mid-arm, the right median nerve CSA was 10.3 mm2.    Scanning the muscles, the echogenicity was normal of the flexor digitorum sublimis, flexor digitorum profundus, flexor pollicis longus, flexor carpi radialis, and pronator teres. The echogenicity of the abductor pollicis brevis was slightly increased.    At the right wrist joint, the radial carpal joint was normal. No abnormal effusion was seen. There was mild tendinosis of the right flexor carpi radialis tendon without increased vascularity. Both the radial and ulnar arteries were well seen and were normal.    IMPRESSION:  This is an abnormal neuromuscular ultrasound examination of the right upper extremity.     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 Please note, interpretation of this study is more difficult as sonographic changes may persist following successful carpal tunnel release surgery. There was not a neuromuscular ultrasound prior to the surgery for comparison. However, there was evidence of a mild \"notch sign\" at the distal carpal tunnel which is suggestive of ongoing compression of the nerve. The median nerve was otherwise followed throughout its anatomic course in the limb from wrist to mid-arm and was otherwise normal.    In addition, there was ultrasound evidence of mild tendinosis of the right flexor carpi radialis tendon.    Performed by: Bao Pantoja MD  Authorized by: Bao Pantoja MD          "

## 2025-06-04 ENCOUNTER — TELEPHONE (OUTPATIENT)
Dept: ORTHOPEDIC SURGERY | Facility: CLINIC | Age: 60
End: 2025-06-04
Payer: COMMERCIAL

## 2025-06-04 NOTE — TELEPHONE ENCOUNTER
Patient called in requesting a return call to discuss the results of the Ultrasound performed on his right hand & wrist, on 5/30/25.

## 2025-08-08 ENCOUNTER — APPOINTMENT (OUTPATIENT)
Dept: ORTHOPEDIC SURGERY | Facility: CLINIC | Age: 60
End: 2025-08-08
Payer: COMMERCIAL

## 2025-08-08 DIAGNOSIS — S46.011A TRAUMATIC TEAR OF RIGHT ROTATOR CUFF, UNSPECIFIED TEAR EXTENT, INITIAL ENCOUNTER: ICD-10-CM

## 2025-08-08 DIAGNOSIS — G56.01 RIGHT CARPAL TUNNEL SYNDROME: ICD-10-CM

## 2025-08-08 PROCEDURE — 99214 OFFICE O/P EST MOD 30 MIN: CPT | Performed by: ORTHOPAEDIC SURGERY

## 2025-08-08 NOTE — PROGRESS NOTES
L Inj/Asp: R knee on 8/8/2025 10:08 AM  Details: 21 G needle, anteromedial approach  Medications: 40 mg methylPREDNISolone acetate 40 mg/mL; 2 mL lidocaine 20 mg/mL (2 %)          
Recurrent carpal tunnel syndrome right wrist he had surgery last year and has not gotten any better still has dense numbness in the hand is also had chronic right shoulder issues which have been well-documented over the years has had normal radiographs has been treated for rotator cuff with injections and physical therapy he has persistent pain including pain at nighttime    Past medical,family and social histories have been reviewed and are up to date.  All other body systems have been reviewed and are negative for complaint.    Awake alert oriented appropriate  Right shoulder no deformity has full range of motion but a positive painful arc sign and pain resisted supraspinatus testing negative speeds test      Right hand: He has thenar atrophy and diminished sensibility median nerve distribution the scar is imperceptible has a positive Phalen's and Tinel's  Impression recurrent carpal tunnel syndrome recommend  EMG may require revision surgery  Check MRI of shoulder rule out cuff tear  
immune

## 2025-08-22 ENCOUNTER — HOSPITAL ENCOUNTER (OUTPATIENT)
Dept: RADIOLOGY | Facility: HOSPITAL | Age: 60
Discharge: HOME | End: 2025-08-22
Payer: COMMERCIAL

## 2025-08-22 DIAGNOSIS — S46.011A TRAUMATIC TEAR OF RIGHT ROTATOR CUFF, UNSPECIFIED TEAR EXTENT, INITIAL ENCOUNTER: ICD-10-CM

## 2025-08-22 PROCEDURE — 73221 MRI JOINT UPR EXTREM W/O DYE: CPT | Mod: RT

## 2025-09-04 ENCOUNTER — HOSPITAL ENCOUNTER (OUTPATIENT)
Dept: NEUROLOGY | Facility: CLINIC | Age: 60
Discharge: HOME | End: 2025-09-04
Payer: COMMERCIAL

## 2025-09-04 DIAGNOSIS — G56.01 RIGHT CARPAL TUNNEL SYNDROME: ICD-10-CM

## 2025-09-04 PROCEDURE — 95886 MUSC TEST DONE W/N TEST COMP: CPT | Mod: GC | Performed by: PSYCHIATRY & NEUROLOGY

## 2025-09-04 PROCEDURE — 95909 NRV CNDJ TST 5-6 STUDIES: CPT | Mod: GC | Performed by: PSYCHIATRY & NEUROLOGY

## 2025-09-04 PROCEDURE — 95886 MUSC TEST DONE W/N TEST COMP: CPT | Performed by: PSYCHIATRY & NEUROLOGY

## 2025-09-04 PROCEDURE — 95909 NRV CNDJ TST 5-6 STUDIES: CPT | Performed by: PSYCHIATRY & NEUROLOGY

## 2025-09-09 ENCOUNTER — APPOINTMENT (OUTPATIENT)
Dept: ORTHOPEDIC SURGERY | Facility: CLINIC | Age: 60
End: 2025-09-09
Payer: COMMERCIAL

## (undated) DEVICE — BANDAGE, ELASTIC, PREMIUM, SELF-CLOSE, 4 IN X 5.5 YD, STERILE

## (undated) DEVICE — BANDAGE, STRETCH, CONFORM, 2 IN X 4.1 YD, STERILE

## (undated) DEVICE — Device

## (undated) DEVICE — DRAPE, SHEET, THREE QUARTER, FAN FOLD, 57 X 77 IN

## (undated) DEVICE — GLOVE, SURGICAL, PROTEXIS PI ORTHO, 8.0, PF, LF

## (undated) DEVICE — CUFF, TOURNIQUET, 18 X 4, DUAL PORT/SNGL BLADDER, DISP, LF

## (undated) DEVICE — PADDING, CAST, COTTON, 3 IN X 4 YD

## (undated) DEVICE — NEEDLE, HYPODERMIC, 25 G X 2 IN

## (undated) DEVICE — GLOVE, SURGICAL, PROTEXIS PI MICRO, 8.0, PF, LF

## (undated) DEVICE — SUTURE, ETHILON, 4-0, BLK, MONO, PS-2 18

## (undated) DEVICE — PADDING, UNDERCAST, WYTEX, 3 IN X 4 YD, STERILE

## (undated) DEVICE — TOWEL, SURGICAL, NEURO, O/R, 16 X 26, BLUE, STERILE

## (undated) DEVICE — DRESSING, GAUZE, PETROLATUM, PATCH, XEROFORM, 1 X 8 IN, STERILE

## (undated) DEVICE — KIT, MINOR, DOUBLE BASIN